# Patient Record
Sex: FEMALE | Race: BLACK OR AFRICAN AMERICAN | NOT HISPANIC OR LATINO
[De-identification: names, ages, dates, MRNs, and addresses within clinical notes are randomized per-mention and may not be internally consistent; named-entity substitution may affect disease eponyms.]

---

## 2018-01-16 ENCOUNTER — TRANSCRIPTION ENCOUNTER (OUTPATIENT)
Age: 28
End: 2018-01-16

## 2018-04-13 ENCOUNTER — TRANSCRIPTION ENCOUNTER (OUTPATIENT)
Age: 28
End: 2018-04-13

## 2019-05-13 ENCOUNTER — TRANSCRIPTION ENCOUNTER (OUTPATIENT)
Age: 29
End: 2019-05-13

## 2019-11-21 ENCOUNTER — APPOINTMENT (OUTPATIENT)
Dept: SURGERY | Facility: CLINIC | Age: 29
End: 2019-11-21
Payer: COMMERCIAL

## 2019-11-21 VITALS
DIASTOLIC BLOOD PRESSURE: 65 MMHG | WEIGHT: 149 LBS | SYSTOLIC BLOOD PRESSURE: 99 MMHG | HEART RATE: 72 BPM | BODY MASS INDEX: 28.13 KG/M2 | HEIGHT: 61 IN | OXYGEN SATURATION: 100 %

## 2019-11-21 PROCEDURE — 99202 OFFICE O/P NEW SF 15 MIN: CPT

## 2019-12-05 ENCOUNTER — OUTPATIENT (OUTPATIENT)
Dept: OUTPATIENT SERVICES | Facility: HOSPITAL | Age: 29
LOS: 1 days | End: 2019-12-05
Payer: COMMERCIAL

## 2019-12-05 VITALS
DIASTOLIC BLOOD PRESSURE: 67 MMHG | RESPIRATION RATE: 16 BRPM | WEIGHT: 145.06 LBS | TEMPERATURE: 98 F | OXYGEN SATURATION: 100 % | HEART RATE: 78 BPM | SYSTOLIC BLOOD PRESSURE: 104 MMHG

## 2019-12-05 DIAGNOSIS — Z01.818 ENCOUNTER FOR OTHER PREPROCEDURAL EXAMINATION: ICD-10-CM

## 2019-12-05 DIAGNOSIS — K42.9 UMBILICAL HERNIA WITHOUT OBSTRUCTION OR GANGRENE: ICD-10-CM

## 2019-12-05 DIAGNOSIS — Z98.890 OTHER SPECIFIED POSTPROCEDURAL STATES: Chronic | ICD-10-CM

## 2019-12-05 LAB
HCG SERPL-ACNC: <1 MIU/ML — SIGNIFICANT CHANGE UP
HCT VFR BLD CALC: 40.2 % — SIGNIFICANT CHANGE UP (ref 34.5–45)
HGB BLD-MCNC: 13.2 G/DL — SIGNIFICANT CHANGE UP (ref 11.5–15.5)
MCHC RBC-ENTMCNC: 29.1 PG — SIGNIFICANT CHANGE UP (ref 27–34)
MCHC RBC-ENTMCNC: 32.8 GM/DL — SIGNIFICANT CHANGE UP (ref 32–36)
MCV RBC AUTO: 88.5 FL — SIGNIFICANT CHANGE UP (ref 80–100)
NRBC # BLD: 0 /100 WBCS — SIGNIFICANT CHANGE UP (ref 0–0)
PLATELET # BLD AUTO: 311 K/UL — SIGNIFICANT CHANGE UP (ref 150–400)
RBC # BLD: 4.54 M/UL — SIGNIFICANT CHANGE UP (ref 3.8–5.2)
RBC # FLD: 13.7 % — SIGNIFICANT CHANGE UP (ref 10.3–14.5)
WBC # BLD: 6.12 K/UL — SIGNIFICANT CHANGE UP (ref 3.8–10.5)
WBC # FLD AUTO: 6.12 K/UL — SIGNIFICANT CHANGE UP (ref 3.8–10.5)

## 2019-12-05 PROCEDURE — 85027 COMPLETE CBC AUTOMATED: CPT

## 2019-12-05 PROCEDURE — G0463: CPT

## 2019-12-05 PROCEDURE — 36415 COLL VENOUS BLD VENIPUNCTURE: CPT

## 2019-12-05 PROCEDURE — 84702 CHORIONIC GONADOTROPIN TEST: CPT

## 2019-12-05 NOTE — H&P PST ADULT - NSICDXPROBLEM_GEN_ALL_CORE_FT
PROBLEM DIAGNOSES  Problem: Preoperative testing  Assessment and Plan: No medical clearance needed as per surgeon. CBC and HCG ordered. Pre-op instructions and surgical scrubs given and pt verbalized understanding.      Problem: Umbilical hernia without obstruction or gangrene  Assessment and Plan: Open repair umbilical hernia with mesh on 12/9/19.

## 2019-12-05 NOTE — H&P PST ADULT - HISTORY OF PRESENT ILLNESS
30 yo female with no PMH here for PST. Pt first noticed bulge in umbilicus in 7/2019. Pt diagnosed with umbilical hernia by GI doctor and referred to surgeon. Pt reports to pain especially with physical exertion. Pt reports to occasional nausea but denies vomiting. Pt s/p endoscopy and diagnosed with hiatal hernia. Pt denies umbilical hernia from increasing in size. Pt electing for open repair umbilical hernia with mesh on 12/9/19.

## 2019-12-05 NOTE — H&P PST ADULT - NSICDXFAMILYHX_GEN_ALL_CORE_FT
FAMILY HISTORY:  Family history of colon cancer in mother  Family history of pacemaker, (Pt with LVAD, Father alive)

## 2019-12-05 NOTE — H&P PST ADULT - GASTROINTESTINAL DETAILS
normal/bowel sounds normal/no bruit/nontender/no distention/soft/no rebound tenderness/no guarding/no masses palpable/no rigidity/no organomegaly

## 2019-12-08 ENCOUNTER — TRANSCRIPTION ENCOUNTER (OUTPATIENT)
Age: 29
End: 2019-12-08

## 2019-12-09 ENCOUNTER — OUTPATIENT (OUTPATIENT)
Dept: OUTPATIENT SERVICES | Facility: HOSPITAL | Age: 29
LOS: 1 days | End: 2019-12-09
Payer: COMMERCIAL

## 2019-12-09 ENCOUNTER — APPOINTMENT (OUTPATIENT)
Dept: SURGERY | Facility: HOSPITAL | Age: 29
End: 2019-12-09

## 2019-12-09 ENCOUNTER — RESULT REVIEW (OUTPATIENT)
Age: 29
End: 2019-12-09

## 2019-12-09 VITALS
DIASTOLIC BLOOD PRESSURE: 62 MMHG | RESPIRATION RATE: 18 BRPM | OXYGEN SATURATION: 99 % | SYSTOLIC BLOOD PRESSURE: 106 MMHG

## 2019-12-09 VITALS
HEART RATE: 76 BPM | WEIGHT: 139.99 LBS | SYSTOLIC BLOOD PRESSURE: 97 MMHG | DIASTOLIC BLOOD PRESSURE: 58 MMHG | TEMPERATURE: 98 F | RESPIRATION RATE: 12 BRPM | HEIGHT: 65 IN | OXYGEN SATURATION: 98 %

## 2019-12-09 DIAGNOSIS — K42.9 UMBILICAL HERNIA WITHOUT OBSTRUCTION OR GANGRENE: ICD-10-CM

## 2019-12-09 DIAGNOSIS — Z98.890 OTHER SPECIFIED POSTPROCEDURAL STATES: Chronic | ICD-10-CM

## 2019-12-09 PROCEDURE — 49653: CPT

## 2019-12-09 PROCEDURE — 86900 BLOOD TYPING SEROLOGIC ABO: CPT

## 2019-12-09 PROCEDURE — 36415 COLL VENOUS BLD VENIPUNCTURE: CPT

## 2019-12-09 PROCEDURE — 49653: CPT | Mod: AS

## 2019-12-09 PROCEDURE — 88302 TISSUE EXAM BY PATHOLOGIST: CPT | Mod: 26

## 2019-12-09 PROCEDURE — 49652: CPT

## 2019-12-09 PROCEDURE — 88302 TISSUE EXAM BY PATHOLOGIST: CPT

## 2019-12-09 PROCEDURE — 86901 BLOOD TYPING SEROLOGIC RH(D): CPT

## 2019-12-09 PROCEDURE — 86850 RBC ANTIBODY SCREEN: CPT

## 2019-12-09 RX ORDER — ACETAMINOPHEN 500 MG
2 TABLET ORAL
Qty: 0 | Refills: 0 | DISCHARGE

## 2019-12-09 RX ORDER — HYDROMORPHONE HYDROCHLORIDE 2 MG/ML
0.5 INJECTION INTRAMUSCULAR; INTRAVENOUS; SUBCUTANEOUS
Refills: 0 | Status: DISCONTINUED | OUTPATIENT
Start: 2019-12-09 | End: 2019-12-09

## 2019-12-09 RX ORDER — OXYCODONE HYDROCHLORIDE 5 MG/1
1 TABLET ORAL
Qty: 20 | Refills: 0
Start: 2019-12-09 | End: 2019-12-13

## 2019-12-09 RX ORDER — OXYCODONE HYDROCHLORIDE 5 MG/1
5 TABLET ORAL ONCE
Refills: 0 | Status: DISCONTINUED | OUTPATIENT
Start: 2019-12-09 | End: 2019-12-09

## 2019-12-09 RX ORDER — SODIUM CHLORIDE 9 MG/ML
1000 INJECTION, SOLUTION INTRAVENOUS
Refills: 0 | Status: DISCONTINUED | OUTPATIENT
Start: 2019-12-09 | End: 2019-12-09

## 2019-12-09 RX ORDER — METOCLOPRAMIDE HCL 10 MG
5 TABLET ORAL ONCE
Refills: 0 | Status: DISCONTINUED | OUTPATIENT
Start: 2019-12-09 | End: 2019-12-09

## 2019-12-09 RX ORDER — OXYCODONE HYDROCHLORIDE 5 MG/1
10 TABLET ORAL ONCE
Refills: 0 | Status: DISCONTINUED | OUTPATIENT
Start: 2019-12-09 | End: 2019-12-09

## 2019-12-09 RX ORDER — CEFAZOLIN SODIUM 1 G
2000 VIAL (EA) INJECTION ONCE
Refills: 0 | Status: COMPLETED | OUTPATIENT
Start: 2019-12-09 | End: 2019-12-09

## 2019-12-09 RX ADMIN — SODIUM CHLORIDE 100 MILLILITER(S): 9 INJECTION, SOLUTION INTRAVENOUS at 15:12

## 2019-12-09 RX ADMIN — SODIUM CHLORIDE 60 MILLILITER(S): 9 INJECTION, SOLUTION INTRAVENOUS at 12:04

## 2019-12-09 NOTE — ASU DISCHARGE PLAN (ADULT/PEDIATRIC) - CARE PROVIDER_API CALL
Dada Price)  Surgery  [address] [place] [address]  Phone: [phone]  Fax: [phone]  Follow Up Time:

## 2019-12-16 PROBLEM — K42.9 UMBILICAL HERNIA WITHOUT OBSTRUCTION OR GANGRENE: Chronic | Status: ACTIVE | Noted: 2019-12-05

## 2019-12-19 ENCOUNTER — APPOINTMENT (OUTPATIENT)
Dept: SURGERY | Facility: CLINIC | Age: 29
End: 2019-12-19
Payer: COMMERCIAL

## 2019-12-19 VITALS
SYSTOLIC BLOOD PRESSURE: 107 MMHG | OXYGEN SATURATION: 97 % | DIASTOLIC BLOOD PRESSURE: 76 MMHG | WEIGHT: 149 LBS | HEIGHT: 61 IN | HEART RATE: 80 BPM | RESPIRATION RATE: 14 BRPM | BODY MASS INDEX: 28.13 KG/M2

## 2019-12-19 PROCEDURE — 99024 POSTOP FOLLOW-UP VISIT: CPT

## 2019-12-23 ENCOUNTER — OTHER (OUTPATIENT)
Age: 29
End: 2019-12-23

## 2019-12-26 ENCOUNTER — OTHER (OUTPATIENT)
Age: 29
End: 2019-12-26

## 2022-04-18 ENCOUNTER — RESULT REVIEW (OUTPATIENT)
Age: 32
End: 2022-04-18

## 2022-05-17 PROBLEM — Z00.00 ENCOUNTER FOR PREVENTIVE HEALTH EXAMINATION: Status: ACTIVE | Noted: 2022-05-17

## 2022-05-18 ENCOUNTER — NON-APPOINTMENT (OUTPATIENT)
Age: 32
End: 2022-05-18

## 2022-05-18 ENCOUNTER — APPOINTMENT (OUTPATIENT)
Dept: ANTEPARTUM | Facility: CLINIC | Age: 32
End: 2022-05-18
Payer: COMMERCIAL

## 2022-05-18 ENCOUNTER — ASOB RESULT (OUTPATIENT)
Age: 32
End: 2022-05-18

## 2022-05-18 DIAGNOSIS — O35.1XX0 MATERNAL CARE FOR (SUSPECTED) CHROMOSOMAL ABNORMALITY IN FETUS, NOT APPLICABLE OR UNSPECIFIED: ICD-10-CM

## 2022-05-18 PROCEDURE — 76813 OB US NUCHAL MEAS 1 GEST: CPT

## 2022-05-18 PROCEDURE — ZZZZZ: CPT

## 2022-05-18 PROCEDURE — 36416 COLLJ CAPILLARY BLOOD SPEC: CPT

## 2022-05-23 LAB
1ST TRIMESTER DATA: NORMAL
ADDENDUM DOC: NORMAL
AFP PNL SERPL: NORMAL
AFP SERPL-ACNC: NORMAL
CLINICAL BIOCHEMIST REVIEW: NORMAL
FREE BETA HCG 1ST TRIMESTER: NORMAL
Lab: NORMAL
NOTES NTD: NORMAL
NT: NORMAL
PAPP-A SERPL-ACNC: NORMAL
TRISOMY 18/3: NORMAL

## 2022-06-10 ENCOUNTER — ASOB RESULT (OUTPATIENT)
Age: 32
End: 2022-06-10

## 2022-06-10 ENCOUNTER — APPOINTMENT (OUTPATIENT)
Dept: MATERNAL FETAL MEDICINE | Facility: CLINIC | Age: 32
End: 2022-06-10
Payer: COMMERCIAL

## 2022-06-10 PROCEDURE — 99202 OFFICE O/P NEW SF 15 MIN: CPT | Mod: 95

## 2022-06-16 ENCOUNTER — APPOINTMENT (OUTPATIENT)
Dept: ANTEPARTUM | Facility: CLINIC | Age: 32
End: 2022-06-16
Payer: COMMERCIAL

## 2022-06-16 PROCEDURE — 36415 COLL VENOUS BLD VENIPUNCTURE: CPT

## 2022-06-20 LAB
1ST TRIMESTER DATA: NORMAL
2ND TRIMESTER DATA: NORMAL
AFP PNL SERPL: NORMAL
AFP SERPL-ACNC: NORMAL
AFP SERPL-ACNC: NORMAL
B-HCG FREE SERPL-MCNC: NORMAL
CLINICAL BIOCHEMIST REVIEW: NORMAL
FREE BETA HCG 1ST TRIMESTER: NORMAL
INHIBIN A SERPL-MCNC: NORMAL
NOTES NTD: NORMAL
NT: NORMAL
PAPP-A SERPL-ACNC: NORMAL
U ESTRIOL SERPL-SCNC: NORMAL

## 2022-06-24 ENCOUNTER — NON-APPOINTMENT (OUTPATIENT)
Age: 32
End: 2022-06-24

## 2022-07-18 ENCOUNTER — ASOB RESULT (OUTPATIENT)
Age: 32
End: 2022-07-18

## 2022-07-18 ENCOUNTER — APPOINTMENT (OUTPATIENT)
Dept: ANTEPARTUM | Facility: CLINIC | Age: 32
End: 2022-07-18

## 2022-07-18 PROCEDURE — 76805 OB US >/= 14 WKS SNGL FETUS: CPT

## 2022-07-18 PROCEDURE — 76817 TRANSVAGINAL US OBSTETRIC: CPT

## 2022-08-01 ENCOUNTER — ASOB RESULT (OUTPATIENT)
Age: 32
End: 2022-08-01

## 2022-08-01 ENCOUNTER — APPOINTMENT (OUTPATIENT)
Dept: ANTEPARTUM | Facility: CLINIC | Age: 32
End: 2022-08-01

## 2022-08-01 PROCEDURE — 76816 OB US FOLLOW-UP PER FETUS: CPT

## 2022-09-28 ENCOUNTER — APPOINTMENT (OUTPATIENT)
Dept: ANTEPARTUM | Facility: CLINIC | Age: 32
End: 2022-09-28

## 2022-09-28 ENCOUNTER — ASOB RESULT (OUTPATIENT)
Age: 32
End: 2022-09-28

## 2022-09-28 PROCEDURE — 76816 OB US FOLLOW-UP PER FETUS: CPT

## 2022-10-31 ENCOUNTER — ASOB RESULT (OUTPATIENT)
Age: 32
End: 2022-10-31

## 2022-10-31 ENCOUNTER — APPOINTMENT (OUTPATIENT)
Dept: ANTEPARTUM | Facility: CLINIC | Age: 32
End: 2022-10-31

## 2022-10-31 PROCEDURE — 76816 OB US FOLLOW-UP PER FETUS: CPT

## 2022-11-24 ENCOUNTER — INPATIENT (INPATIENT)
Facility: HOSPITAL | Age: 32
LOS: 2 days | Discharge: HOME | End: 2022-11-27
Attending: OBSTETRICS & GYNECOLOGY | Admitting: OBSTETRICS & GYNECOLOGY

## 2022-11-24 VITALS — SYSTOLIC BLOOD PRESSURE: 105 MMHG | HEART RATE: 92 BPM | DIASTOLIC BLOOD PRESSURE: 62 MMHG

## 2022-11-24 DIAGNOSIS — Z98.890 OTHER SPECIFIED POSTPROCEDURAL STATES: Chronic | ICD-10-CM

## 2022-11-24 LAB
BASOPHILS # BLD AUTO: 0.05 K/UL — SIGNIFICANT CHANGE UP (ref 0–0.2)
BASOPHILS NFR BLD AUTO: 0.5 % — SIGNIFICANT CHANGE UP (ref 0–2)
EOSINOPHIL # BLD AUTO: 0.03 K/UL — SIGNIFICANT CHANGE UP (ref 0–0.5)
EOSINOPHIL NFR BLD AUTO: 0.3 % — SIGNIFICANT CHANGE UP (ref 0–6)
HCT VFR BLD CALC: 39.4 % — SIGNIFICANT CHANGE UP (ref 34.5–45)
HGB BLD-MCNC: 12.9 G/DL — SIGNIFICANT CHANGE UP (ref 11.5–15.5)
IANC: 7.4 K/UL — SIGNIFICANT CHANGE UP (ref 1.8–7.4)
IMM GRANULOCYTES NFR BLD AUTO: 1.8 % — HIGH (ref 0–0.9)
LYMPHOCYTES # BLD AUTO: 1.69 K/UL — SIGNIFICANT CHANGE UP (ref 1–3.3)
LYMPHOCYTES # BLD AUTO: 17 % — SIGNIFICANT CHANGE UP (ref 13–44)
MCHC RBC-ENTMCNC: 31 PG — SIGNIFICANT CHANGE UP (ref 27–34)
MCHC RBC-ENTMCNC: 32.7 GM/DL — SIGNIFICANT CHANGE UP (ref 32–36)
MCV RBC AUTO: 94.7 FL — SIGNIFICANT CHANGE UP (ref 80–100)
MONOCYTES # BLD AUTO: 0.62 K/UL — SIGNIFICANT CHANGE UP (ref 0–0.9)
MONOCYTES NFR BLD AUTO: 6.2 % — SIGNIFICANT CHANGE UP (ref 2–14)
NEUTROPHILS # BLD AUTO: 7.4 K/UL — SIGNIFICANT CHANGE UP (ref 1.8–7.4)
NEUTROPHILS NFR BLD AUTO: 74.2 % — SIGNIFICANT CHANGE UP (ref 43–77)
NRBC # BLD: 0 /100 WBCS — SIGNIFICANT CHANGE UP (ref 0–0)
NRBC # FLD: 0 K/UL — SIGNIFICANT CHANGE UP (ref 0–0)
PLATELET # BLD AUTO: 236 K/UL — SIGNIFICANT CHANGE UP (ref 150–400)
RBC # BLD: 4.16 M/UL — SIGNIFICANT CHANGE UP (ref 3.8–5.2)
RBC # FLD: 14.5 % — SIGNIFICANT CHANGE UP (ref 10.3–14.5)
WBC # BLD: 9.97 K/UL — SIGNIFICANT CHANGE UP (ref 3.8–10.5)
WBC # FLD AUTO: 9.97 K/UL — SIGNIFICANT CHANGE UP (ref 3.8–10.5)

## 2022-11-24 RX ORDER — OXYTOCIN 10 UNIT/ML
333.33 VIAL (ML) INJECTION
Qty: 20 | Refills: 0 | Status: DISCONTINUED | OUTPATIENT
Start: 2022-11-24 | End: 2022-11-27

## 2022-11-24 RX ORDER — CITRIC ACID/SODIUM CITRATE 300-500 MG
15 SOLUTION, ORAL ORAL EVERY 6 HOURS
Refills: 0 | Status: DISCONTINUED | OUTPATIENT
Start: 2022-11-24 | End: 2022-11-26

## 2022-11-24 RX ORDER — SODIUM CHLORIDE 9 MG/ML
1000 INJECTION, SOLUTION INTRAVENOUS
Refills: 0 | Status: DISCONTINUED | OUTPATIENT
Start: 2022-11-24 | End: 2022-11-26

## 2022-11-24 RX ORDER — CHLORHEXIDINE GLUCONATE 213 G/1000ML
1 SOLUTION TOPICAL ONCE
Refills: 0 | Status: DISCONTINUED | OUTPATIENT
Start: 2022-11-24 | End: 2022-11-26

## 2022-11-24 RX ADMIN — SODIUM CHLORIDE 125 MILLILITER(S): 9 INJECTION, SOLUTION INTRAVENOUS at 22:12

## 2022-11-24 NOTE — OB PROVIDER H&P - NSHPPHYSICALEXAM_GEN_ALL_CORE
T(C): 36.7 (11-24-22 @ 21:00), Max: 36.7 (11-24-22 @ 21:00)  HR: 92 (11-24-22 @ 21:00) (92 - 92)  BP: 105/62 (11-24-22 @ 21:00) (105/62 - 105/62)  RR: 15 (11-24-22 @ 21:00) (15 - 15)  SpO2: --    Gen: NAD, well-appearing, AAOx3   Abd: Soft, gravid  Ext: non-tender, non-edematous  SSE: negative this week as per Dr. Gregory  SVE:  0.5/0/-3  Bedside sono: vertex  FHT: 150bpm, moderate variability, +accels, no decels  [place]: q3-4

## 2022-11-24 NOTE — OB PROVIDER H&P - NSLOWPPHRISK_OBGYN_A_OB
No previous uterine incision/[place] Pregnancy/Less than or equal to 4 previous vaginal births/No known bleeding disorder/No history of postpartum hemorrhage/No other PPH risks indicated

## 2022-11-24 NOTE — OB PROVIDER H&P - ASSESSMENT
A/P: 32y  at 40w GA who presents to L&D for elective induction of labor.   -Admit to L&D  -Consents signed  -Admission labs  -CLD  -IV fluids  -Labor: IOL with buccal cytotec  -Fetus: Cat I tracing. Continuous toco and fetal monitoring.   -GBS: Negative, no GBS ppx required     Discussed with Dr. Colt Ramirez PGY1 A/P: 32y  at 40w GA who presents to L&D for elective induction of labor.   -Admit to L&D  -Consents signed  -Admission labs  -CLD  -IV fluids  -Labor: IOL with buccal cytotec  -Fetus: Cat I tracing. Continuous toco and fetal monitoring.   -GBS: Negative, no GBS ppx required   -Analgesia: Approved for epidural PRN by Dr. Gregory    Discussed with Dr. Colt Ramirez PGY1

## 2022-11-24 NOTE — OB RN PATIENT PROFILE - HEIGHT IN CM
165.1 Benzoyl Peroxide Pregnancy And Lactation Text: This medication is Pregnancy Category C. It is unknown if benzoyl peroxide is excreted in breast milk.

## 2022-11-24 NOTE — OB PROVIDER H&P - HISTORY OF PRESENT ILLNESS
None
32y  at 40w GA who presents to L&D for elective induction of labor. Patient denies vaginal bleeding, contractions and leakage of fluid. She endorses good fetal movement. Denies fevers, chills, nausea, vomiting, chest pain, SOB, dizziness and headache. No other complaints at this time.     JOAN: 22    Prenatal course uncomplicated.  Patient reports she failed her one hour GCT, but passed 3 hour GTT    Obhx:  - 2011: FT  7#6, uncomplicated  - 2013: SAB with d&c - states she was bleeding heavily with miscarriage but did not require a transfusion  - TOP with d&c x3 - , ,   Gynhx: -fibroids, -ovarian cysts, denies STD hx, denies abnormal PAPs, endorses HSV2 seropositive - denies any history of outbreaks or vaginal lesions, per Dr. Gregory, SSE exam negative for lesions in office this week  PMH: Denies including HTN, DM, asthma  PSH:   - D&C x4  - 2018: lsc umbilical hernia repair  Soc hx: Denies EtOH, tobacco and illicit drug use during this pregnancy  Anx/dep:  Endorses anxiety, not on medication and does not see a specialist; denies history of depression  Meds: PNV, Prophylactic Valtrex starting at 36w  Allergies: NKDA  GBS: Negative  EFW: 3400    Will accept blood transfusion

## 2022-11-25 LAB
COVID-19 SPIKE DOMAIN AB INTERP: POSITIVE
COVID-19 SPIKE DOMAIN ANTIBODY RESULT: >250 U/ML — HIGH
SARS-COV-2 IGG+IGM SERPL QL IA: >250 U/ML — HIGH
SARS-COV-2 IGG+IGM SERPL QL IA: POSITIVE
SARS-COV-2 RNA SPEC QL NAA+PROBE: SIGNIFICANT CHANGE UP
T PALLIDUM AB TITR SER: NEGATIVE — SIGNIFICANT CHANGE UP

## 2022-11-25 RX ORDER — SODIUM CHLORIDE 9 MG/ML
3 INJECTION INTRAMUSCULAR; INTRAVENOUS; SUBCUTANEOUS EVERY 8 HOURS
Refills: 0 | Status: DISCONTINUED | OUTPATIENT
Start: 2022-11-25 | End: 2022-11-26

## 2022-11-25 RX ORDER — OXYCODONE HYDROCHLORIDE 5 MG/1
5 TABLET ORAL
Refills: 0 | Status: DISCONTINUED | OUTPATIENT
Start: 2022-11-25 | End: 2022-11-27

## 2022-11-25 RX ORDER — TETANUS TOXOID, REDUCED DIPHTHERIA TOXOID AND ACELLULAR PERTUSSIS VACCINE, ADSORBED 5; 2.5; 8; 8; 2.5 [IU]/.5ML; [IU]/.5ML; UG/.5ML; UG/.5ML; UG/.5ML
0.5 SUSPENSION INTRAMUSCULAR ONCE
Refills: 0 | Status: DISCONTINUED | OUTPATIENT
Start: 2022-11-25 | End: 2022-11-27

## 2022-11-25 RX ORDER — OXYTOCIN 10 UNIT/ML
41.67 VIAL (ML) INJECTION
Qty: 20 | Refills: 0 | Status: DISCONTINUED | OUTPATIENT
Start: 2022-11-25 | End: 2022-11-27

## 2022-11-25 RX ORDER — DIPHENHYDRAMINE HCL 50 MG
25 CAPSULE ORAL EVERY 6 HOURS
Refills: 0 | Status: DISCONTINUED | OUTPATIENT
Start: 2022-11-25 | End: 2022-11-27

## 2022-11-25 RX ORDER — OXYCODONE HYDROCHLORIDE 5 MG/1
5 TABLET ORAL ONCE
Refills: 0 | Status: DISCONTINUED | OUTPATIENT
Start: 2022-11-25 | End: 2022-11-27

## 2022-11-25 RX ORDER — SIMETHICONE 80 MG/1
80 TABLET, CHEWABLE ORAL EVERY 4 HOURS
Refills: 0 | Status: DISCONTINUED | OUTPATIENT
Start: 2022-11-25 | End: 2022-11-27

## 2022-11-25 RX ORDER — DIBUCAINE 1 %
1 OINTMENT (GRAM) RECTAL EVERY 6 HOURS
Refills: 0 | Status: DISCONTINUED | OUTPATIENT
Start: 2022-11-25 | End: 2022-11-27

## 2022-11-25 RX ORDER — KETOROLAC TROMETHAMINE 30 MG/ML
30 SYRINGE (ML) INJECTION ONCE
Refills: 0 | Status: DISCONTINUED | OUTPATIENT
Start: 2022-11-25 | End: 2022-11-25

## 2022-11-25 RX ORDER — IBUPROFEN 200 MG
600 TABLET ORAL EVERY 6 HOURS
Refills: 0 | Status: COMPLETED | OUTPATIENT
Start: 2022-11-25 | End: 2023-10-24

## 2022-11-25 RX ORDER — ACETAMINOPHEN 500 MG
975 TABLET ORAL
Refills: 0 | Status: DISCONTINUED | OUTPATIENT
Start: 2022-11-25 | End: 2022-11-27

## 2022-11-25 RX ORDER — HYDROCORTISONE 1 %
1 OINTMENT (GRAM) TOPICAL EVERY 6 HOURS
Refills: 0 | Status: DISCONTINUED | OUTPATIENT
Start: 2022-11-25 | End: 2022-11-27

## 2022-11-25 RX ORDER — BENZOCAINE 10 %
1 GEL (GRAM) MUCOUS MEMBRANE EVERY 6 HOURS
Refills: 0 | Status: DISCONTINUED | OUTPATIENT
Start: 2022-11-25 | End: 2022-11-27

## 2022-11-25 RX ORDER — MAGNESIUM HYDROXIDE 400 MG/1
30 TABLET, CHEWABLE ORAL
Refills: 0 | Status: DISCONTINUED | OUTPATIENT
Start: 2022-11-25 | End: 2022-11-27

## 2022-11-25 RX ORDER — PRAMOXINE HYDROCHLORIDE 150 MG/15G
1 AEROSOL, FOAM RECTAL EVERY 4 HOURS
Refills: 0 | Status: DISCONTINUED | OUTPATIENT
Start: 2022-11-25 | End: 2022-11-27

## 2022-11-25 RX ORDER — LANOLIN
1 OINTMENT (GRAM) TOPICAL EVERY 6 HOURS
Refills: 0 | Status: DISCONTINUED | OUTPATIENT
Start: 2022-11-25 | End: 2022-11-27

## 2022-11-25 RX ORDER — AER TRAVELER 0.5 G/1
1 SOLUTION RECTAL; TOPICAL EVERY 4 HOURS
Refills: 0 | Status: DISCONTINUED | OUTPATIENT
Start: 2022-11-25 | End: 2022-11-27

## 2022-11-25 RX ADMIN — Medication 0.2 MILLIGRAM(S): at 20:07

## 2022-11-25 RX ADMIN — SODIUM CHLORIDE 125 MILLILITER(S): 9 INJECTION, SOLUTION INTRAVENOUS at 19:30

## 2022-11-25 RX ADMIN — Medication 125 MILLIUNIT(S)/MIN: at 22:00

## 2022-11-25 RX ADMIN — SODIUM CHLORIDE 125 MILLILITER(S): 9 INJECTION, SOLUTION INTRAVENOUS at 07:38

## 2022-11-25 RX ADMIN — Medication 30 MILLIGRAM(S): at 23:44

## 2022-11-25 NOTE — OB PROVIDER LABOR PROGRESS NOTE - NS_OBIHIFHRDETAILS_OBGYN_ALL_OB_FT
150 moderate variability, +15x15 accels, +variable and prolonged decels
155 mod glenn/+accels/-decels

## 2022-11-25 NOTE — OB RN DELIVERY SUMMARY - NS_SEPSISRSKCALC_OBGYN_ALL_OB_FT
EOS calculated successfully. EOS Risk Factor: 0.5/1000 live births ([facility] national incidence); GA=40w1d; Temp=98.42; ROM=0.3; GBS='Negative'; Antibiotics='No antibiotics or any antibiotics < 2 hrs prior to birth'

## 2022-11-25 NOTE — OB PROVIDER LABOR PROGRESS NOTE - ASSESSMENT
@40.1 wks rrIOL  VE unchanged  Cervical balloon placed without complication  60cc's instilled in both uterine and vaginal balloons  Patient tolerated well  Cont with cytotec  Cont EFM/TOCO  Will reassess PRN    miroslava Steen NP
33 yo  @40.1 wga rrIOL.  GBS (-)    1.  rrIOL -  s/p CB now 5 cm ctxing 1-2, continue expectant management at this time  2.  FHT category II will continue to monitor closely  3.  Dr. Gregory updated and en route    Samara Sales MD

## 2022-11-25 NOTE — OB PROVIDER LABOR PROGRESS NOTE - NS_SUBJECTIVE/OBJECTIVE_OBGYN_ALL_OB_FT
Patient seen for placement of cooks cervical balloon. Effective epidural in place. Patient comfortable, no complaints at this time.  VS  T(C): 36.9 (11-25-22 @ 09:36)  HR: 75 (11-25-22 @ 11:20)  BP: 99/64 (11-25-22 @ 11:20)  RR: 15 (11-24-22 @ 21:00)  SpO2: 100% (11-25-22 @ 11:14)
RN informed me pt feeling rectal pressure.  Went to room to examine pt.  VE 5/90/-3, intact.  Requesting epidural top-off.

## 2022-11-25 NOTE — OB PROVIDER DELIVERY SUMMARY - AMNIOTIC FLUID COLOR, LABOR
-- DO NOT REPLY / DO NOT REPLY ALL --  -- Message is from Engagement Center Operations (ECO) --    General Patient Message RETURNING PHONE CALL but no task on file ,      Caller Information       Type Contact Phone/Fax    10/18/2022 02:41 PM CDT Phone (Incoming) Morrison Azam Michell R (Self) [phone] (H)        Alternative phone number: NA    Can a detailed message be left? Yes    Message Turnaround:     Is it Working Hours? Yes - Working Hours     [place]:    Please give this turnaround time to the caller:   \"This message will be sent to [state Provider's name]. The clinical team will fulfill your request as soon as they review your message.\"                 clear

## 2022-11-25 NOTE — OB PROVIDER DELIVERY SUMMARY - NSPROVIDERDELIVERYNOTE_OBGYN_ALL_OB_FT
of Viable female infant from PHANI presentation over a first degree laceration. Cord around the body noted. Rectum and intact. Fundus firm. IM Methergine given fro continued oozing, APGARS 9/9.  of Viable female infant from PHANI presentation over a first degree laceration. Cord around the body noted. Rectum and intact. Fundus firm. IM Methergine given fro continued oozing, APGAR 9/9. Birth Wt. 7 lbs 3 oz.

## 2022-11-25 NOTE — OB RN DELIVERY SUMMARY - NSSELHIDDEN_OBGYN_ALL_OB_FT
[NS_DeliveryAttending1_OBGYN_ALL_OB_FT:[id number]],[NS_DeliveryRN_OBGYN_ALL_OB_FT:[id number]=]

## 2022-11-26 ENCOUNTER — TRANSCRIPTION ENCOUNTER (OUTPATIENT)
Age: 32
End: 2022-11-26

## 2022-11-26 LAB
HCT VFR BLD CALC: 42.9 % — SIGNIFICANT CHANGE UP (ref 34.5–45)
HGB BLD-MCNC: 14.1 G/DL — SIGNIFICANT CHANGE UP (ref 11.5–15.5)
MCHC RBC-ENTMCNC: 30.8 PG — SIGNIFICANT CHANGE UP (ref 27–34)
MCHC RBC-ENTMCNC: 32.9 GM/DL — SIGNIFICANT CHANGE UP (ref 32–36)
MCV RBC AUTO: 93.7 FL — SIGNIFICANT CHANGE UP (ref 80–100)
NRBC # BLD: 0 /100 WBCS — SIGNIFICANT CHANGE UP (ref 0–0)
NRBC # FLD: 0 K/UL — SIGNIFICANT CHANGE UP (ref 0–0)
PLATELET # BLD AUTO: 205 K/UL — SIGNIFICANT CHANGE UP (ref 150–400)
RBC # BLD: 4.58 M/UL — SIGNIFICANT CHANGE UP (ref 3.8–5.2)
RBC # FLD: 14.6 % — HIGH (ref 10.3–14.5)
WBC # BLD: 15.55 K/UL — HIGH (ref 3.8–10.5)
WBC # FLD AUTO: 15.55 K/UL — HIGH (ref 3.8–10.5)

## 2022-11-26 RX ORDER — ACETAMINOPHEN 500 MG
2 TABLET ORAL
Qty: 0 | Refills: 0 | DISCHARGE

## 2022-11-26 RX ORDER — IBUPROFEN 200 MG
600 TABLET ORAL EVERY 6 HOURS
Refills: 0 | Status: DISCONTINUED | OUTPATIENT
Start: 2022-11-26 | End: 2022-11-27

## 2022-11-26 RX ORDER — IBUPROFEN 200 MG
1 TABLET ORAL
Qty: 0 | Refills: 0 | DISCHARGE
Start: 2022-11-26

## 2022-11-26 RX ADMIN — Medication 0.2 MILLIGRAM(S): at 16:30

## 2022-11-26 RX ADMIN — Medication 0.2 MILLIGRAM(S): at 00:00

## 2022-11-26 RX ADMIN — Medication 1 TABLET(S): at 12:03

## 2022-11-26 RX ADMIN — Medication 975 MILLIGRAM(S): at 08:24

## 2022-11-26 RX ADMIN — Medication 975 MILLIGRAM(S): at 09:00

## 2022-11-26 RX ADMIN — Medication 600 MILLIGRAM(S): at 17:53

## 2022-11-26 RX ADMIN — Medication 600 MILLIGRAM(S): at 06:03

## 2022-11-26 RX ADMIN — Medication 0.2 MILLIGRAM(S): at 04:05

## 2022-11-26 RX ADMIN — Medication 0.2 MILLIGRAM(S): at 08:24

## 2022-11-26 RX ADMIN — Medication 600 MILLIGRAM(S): at 12:30

## 2022-11-26 RX ADMIN — Medication 600 MILLIGRAM(S): at 18:22

## 2022-11-26 RX ADMIN — Medication 600 MILLIGRAM(S): at 06:48

## 2022-11-26 RX ADMIN — Medication 0.2 MILLIGRAM(S): at 12:02

## 2022-11-26 RX ADMIN — Medication 600 MILLIGRAM(S): at 12:02

## 2022-11-26 NOTE — DISCHARGE NOTE OB - NS MD DC FALL RISK RISK
For information on Fall & Injury Prevention, visit: [email or web address] OR  [email or web address] OR  https://www.cdc.gov/steadi/patient.html

## 2022-11-26 NOTE — LACTATION INITIAL EVALUATION - LACTATION INTERVENTIONS
initiate/review safe skin-to-skin/initiate/review hand expression/initiate/review techniques for position and latch/initiate/review supplementation plan due to medical indications/initiate/review breast massage/compression/reviewed components of an effective feeding and at least 8 effective feedings per day required/reviewed importance of monitoring infant diapers, the breastfeeding log, and minimum output each day/reviewed risks of unnecessary formula supplementation/reviewed benefits and recommendations for rooming in/reviewed feeding on demand/by cue at least 8 times a day/reviewed indications of inadequate milk transfer that would require supplementation

## 2022-11-26 NOTE — DISCHARGE NOTE OB - MEDICATION SUMMARY - MEDICATIONS TO TAKE
I will START or STAY ON the medications listed below when I get home from the hospital:    ibuprofen 600 mg oral tablet  -- 1 tab(s) by mouth every 6 hours, As Needed  -- Indication: For pain    Tylenol Extra Strength 500 mg oral tablet  -- 2 tab(s) by mouth every 6 hours, As Needed  -- Indication: For pain    Prenatal Multivitamins with Folic Acid 1 mg oral tablet  -- 1 tab(s) by mouth once a day  -- Indication: For postpartum

## 2022-11-26 NOTE — DISCHARGE NOTE OB - CARE PROVIDER_API CALL
Luh Lozano  OBSTETRICS AND GYNECOLOGY  [address] [id number]  Phone: [phone]  Fax: [phone]  Established Patient  Follow Up Time:

## 2022-11-26 NOTE — DISCHARGE NOTE OB - CARE PLAN
Principal Discharge DX:	 (normal spontaneous vaginal delivery)  Assessment and plan of treatment:	Routine postpartum care   1

## 2022-11-26 NOTE — DISCHARGE NOTE OB - MEDICATION SUMMARY - MEDICATIONS TO STOP TAKING
I will STOP taking the medications listed below when I get home from the hospital:    Aleve 220 mg oral capsule  -- 1 cap(s) by mouth every 12 hours, As Needed    oxyCODONE 5 mg oral tablet  -- 1 tab(s) by mouth every 6 hours, As Needed -for severe pain MDD:4  -- Caution federal law prohibits the transfer of this drug to any person other  than the person for whom it was prescribed.  It is very important that you take or use this exactly as directed.  Do not skip doses or discontinue unless directed by your doctor.  May cause drowsiness.  Alcohol may intensify this effect.  Use care when operating dangerous machinery.  This prescription cannot be refilled.  Using more of this medication than prescribed may cause serious breathing problems.

## 2022-11-26 NOTE — DISCHARGE NOTE OB - MATERIALS PROVIDED
Vaccinations/[facility]  Screening Program/  Immunization Record/Breastfeeding Log/Breastfeeding Mother’s Support Group Information/Guide to Postpartum Care/[facility] Hearing Screen Program/Back To Sleep Handout/Birth Certificate Instructions/Discharge Medication Information for Patients and Families Pocket Guide

## 2022-11-26 NOTE — DISCHARGE NOTE OB - PATIENT PORTAL LINK FT
You can access the FollowMyHealth Patient Portal offered by [facility] by registering at the following website: [email or web address]. By joining [email or web address]’s FollowMyHealth portal, you will also be able to view your health information using other applications (apps) compatible with our system.

## 2022-11-26 NOTE — LACTATION INITIAL EVALUATION - INTERVENTION OUTCOME
nbn less than 24  hours  . reviewed with  mother  first  24  hours   safe  skin  to  skin  and  observing   for  feeding  cues/verbalizes understanding/Lactation team to follow up

## 2022-11-27 VITALS
SYSTOLIC BLOOD PRESSURE: 116 MMHG | DIASTOLIC BLOOD PRESSURE: 67 MMHG | TEMPERATURE: 97 F | HEART RATE: 77 BPM | OXYGEN SATURATION: 95 % | RESPIRATION RATE: 18 BRPM

## 2022-11-27 RX ORDER — SENNA PLUS 8.6 MG/1
1 TABLET ORAL
Refills: 0 | Status: DISCONTINUED | OUTPATIENT
Start: 2022-11-27 | End: 2022-11-27

## 2022-11-27 RX ADMIN — Medication 600 MILLIGRAM(S): at 00:33

## 2022-11-27 RX ADMIN — SENNA PLUS 1 TABLET(S): 8.6 TABLET ORAL at 05:46

## 2022-11-27 RX ADMIN — Medication 975 MILLIGRAM(S): at 05:00

## 2022-11-27 RX ADMIN — Medication 1 TABLET(S): at 12:15

## 2022-11-27 RX ADMIN — Medication 600 MILLIGRAM(S): at 01:00

## 2022-11-27 RX ADMIN — Medication 975 MILLIGRAM(S): at 04:23

## 2022-11-27 NOTE — PROGRESS NOTE ADULT - SUBJECTIVE AND OBJECTIVE BOX
NP provider note:    Patient seen at bedside resting comfortably offers no current complaints.  Ambulating and voiding without difficulty.  Passing flatus and tolerating regular diet.  both breast/bottle feeding. Bonding well w  Denies HA, CP, SOB, N/V/D, dizziness, palpitations, worsening abdominal pain, worsening vaginal bleeding, or any other concerns.      Vital Signs Last 24 Hrs  T(C): 37.2 (2022 05:54), Max: 37.2 (2022 05:54)  T(F): 98.9 (2022 05:54), Max: 98.9 (2022 05:54)  HR: 90 (2022 05:54) (65 - 90)  BP: 115/51 (2022 05:54) (98/72 - 115/51)  BP(mean): --  RR: 18 (2022 05:54) (17 - 18)  SpO2: 98% (2022 05:54) (97% - 99%)    Parameters below as of 2022 05:54  Patient On (Oxygen Delivery Method): room air    Physical Exam:     Gen: A&Ox 3, NAD  Chest: CTA B/L  Cardiac: S1,S2; RRR  Breast: Soft, nontender, nonengorged  Abdomen: +BS; Soft, nontender, ND; Fundus firm below umbilicus  Gyn: Min lochia  Ext: Nontender, DTRS 2+, no worsening edema                          14.1   15.55 )-----------( 205      ( 2022 06:20 )             42.9        A/P:  PPD#2 s/p       - Cont postpartum care  - cont PO analgesia  - ambulation encouraged , inc PO hydration  -  Discharge home with instructions  - Follow up in office in 5-6 weeks for postpartum visit  - Breastfeeding encouraged   - Discharge home per DR Gregory (approval for discharge given 22 0858)    Germaine Mcdonough AGNP-C   [id number]
NP provider note:    Patient seen at bedside resting comfortably offers no current complaints.  Ambulating and voiding without difficulty.  Passing flatus and tolerating regular diet.  both breast/bottle feeding. Bonding well w  Denies HA, CP, SOB, N/V/D, dizziness, palpitations, worsening abdominal pain, worsening vaginal bleeding, or any other concerns.      Vital Signs Last 24 Hrs  T(C): 36.7 (2022 09:49), Max: 36.9 (2022 05:06)  T(F): 98.1 (2022 09:49), Max: 98.4 (2022 05:06)  HR: 65 (2022 09:49) (60 - 117)  BP: 98/72 (2022 09:49) (91/61 - 132/74)  BP(mean): --  RR: 18 (2022 09:49) (18 - 18)  SpO2: 97% (2022 09:49) (87% - 100%)    Parameters below as of 2022 05:06  Patient On (Oxygen Delivery Method): room air        Physical Exam:     Gen: A&Ox 3, NAD  Chest: CTA B/L  Cardiac: S1,S2; RRR  Breast: Soft, nontender, nonengorged  Abdomen: +BS; Soft, nontender, ND; Fundus firm below umbilicus  Gyn: Min lochia  Ext: Nontender, DTRS 2+, no worsening edema                          14.1   15.55 )-----------( 205      ( 2022 06:20 )             42.9        A/P:  PPD#1 s/p       - Cont postpartum care  - cont PO analgesia  - ambulation encouraged , inc PO hydration  -  Discharge home with instructions  - Follow up in office in 5-6 weeks for postpartum visit  - Breastfeeding encouraged   - Discharge planning, consider late dc otherwise     Germaine Mcdonough AGNP-C   [id number]

## 2023-02-19 ENCOUNTER — NON-APPOINTMENT (OUTPATIENT)
Age: 33
End: 2023-02-19

## 2023-03-26 NOTE — OB RN DELIVERY SUMMARY - NS_RNDELIVATTEST_OBGYN_ALL_OB
OB Provider reported that the vagina was inspected and no foreign body was found/Laps, needles and instrument count was correct
.

## 2023-09-15 ENCOUNTER — APPOINTMENT (OUTPATIENT)
Dept: ANTEPARTUM | Facility: CLINIC | Age: 33
End: 2023-09-15
Payer: COMMERCIAL

## 2023-09-15 ENCOUNTER — NON-APPOINTMENT (OUTPATIENT)
Age: 33
End: 2023-09-15

## 2023-09-15 ENCOUNTER — ASOB RESULT (OUTPATIENT)
Age: 33
End: 2023-09-15

## 2023-09-15 PROCEDURE — 76801 OB US < 14 WKS SINGLE FETUS: CPT

## 2023-09-18 ENCOUNTER — ASOB RESULT (OUTPATIENT)
Age: 33
End: 2023-09-18

## 2023-09-18 ENCOUNTER — APPOINTMENT (OUTPATIENT)
Dept: MATERNAL FETAL MEDICINE | Facility: CLINIC | Age: 33
End: 2023-09-18
Payer: COMMERCIAL

## 2023-09-18 PROCEDURE — 99202 OFFICE O/P NEW SF 15 MIN: CPT | Mod: 95

## 2023-09-18 PROCEDURE — 99212 OFFICE O/P EST SF 10 MIN: CPT

## 2023-10-08 ENCOUNTER — LABORATORY RESULT (OUTPATIENT)
Age: 33
End: 2023-10-08

## 2023-10-09 ENCOUNTER — ASOB RESULT (OUTPATIENT)
Age: 33
End: 2023-10-09

## 2023-10-09 ENCOUNTER — APPOINTMENT (OUTPATIENT)
Dept: ANTEPARTUM | Facility: CLINIC | Age: 33
End: 2023-10-09
Payer: COMMERCIAL

## 2023-10-09 ENCOUNTER — TRANSCRIPTION ENCOUNTER (OUTPATIENT)
Age: 33
End: 2023-10-09

## 2023-10-09 DIAGNOSIS — O35.9XX0 MATERNAL CARE FOR (SUSPECTED) FETAL ABNORMALITY AND DAMAGE, UNSPECIFIED, NOT APPLICABLE OR UNSPECIFIED: ICD-10-CM

## 2023-10-09 DIAGNOSIS — N91.2 AMENORRHEA, UNSPECIFIED: ICD-10-CM

## 2023-10-09 PROCEDURE — 76813 OB US NUCHAL MEAS 1 GEST: CPT

## 2023-10-09 PROCEDURE — 36415 COLL VENOUS BLD VENIPUNCTURE: CPT

## 2023-10-18 ENCOUNTER — NON-APPOINTMENT (OUTPATIENT)
Age: 33
End: 2023-10-18

## 2023-10-26 LAB
ADDITIONAL US: NORMAL
COMMENTS: AFP: NORMAL
CRL SCAN TWIN B: NORMAL
CRL SCAN: NORMAL
CROWN RUMP LENGTH TWIN B: NORMAL
CROWN RUMP LENGTH: 79.6 MM
DOWN SYNDROME AGE RISK: NORMAL
DOWN SYNDROME INTERPRETATION: NORMAL
DOWN SYNDROME SCREENING RISK: NORMAL
GEST. AGE ON COLLECTION DATE: 13.7 WEEKS
HCG MOM: 1.81
HCG VALUE: 140.6 IU/ML
MATERNAL AGE AT EDD: 34.6 YR
NOTE: AFP: NORMAL
NT MOM TWIN B: NORMAL
NT TWIN B: NORMAL
NUCHAL TRANSLUCENCY (NT): 1.6 MM
NUCHAL TRANSLUCENCY MOM: 0.76
NUMBER OF FETUSES: 1
PAPP-A MOM: 1.29
PAPP-A VALUE: 1915.5 NG/ML
RACE: NORMAL
RESULTS AFP: NORMAL
SONOGRAPHER ID#: NORMAL
SUBMIT PART 2 SAMPLE USING: NORMAL
TEST RESULTS: AFP: NORMAL
TRISOMY 18 AGE RISK: NORMAL
TRISOMY 18 INTERPRETATION: NORMAL
TRISOMY 18 SCREENING RISK: NORMAL
WEIGHT AFP: 155 LBS

## 2023-11-01 ENCOUNTER — APPOINTMENT (OUTPATIENT)
Dept: MATERNAL FETAL MEDICINE | Facility: CLINIC | Age: 33
End: 2023-11-01
Payer: COMMERCIAL

## 2023-11-01 ENCOUNTER — ASOB RESULT (OUTPATIENT)
Age: 33
End: 2023-11-01

## 2023-11-01 PROCEDURE — 99212 OFFICE O/P EST SF 10 MIN: CPT | Mod: 95

## 2023-11-03 ENCOUNTER — LABORATORY RESULT (OUTPATIENT)
Age: 33
End: 2023-11-03

## 2023-11-03 ENCOUNTER — APPOINTMENT (OUTPATIENT)
Dept: ANTEPARTUM | Facility: CLINIC | Age: 33
End: 2023-11-03
Payer: COMMERCIAL

## 2023-11-03 ENCOUNTER — APPOINTMENT (OUTPATIENT)
Dept: MATERNAL FETAL MEDICINE | Facility: CLINIC | Age: 33
End: 2023-11-03
Payer: COMMERCIAL

## 2023-11-03 ENCOUNTER — ASOB RESULT (OUTPATIENT)
Age: 33
End: 2023-11-03

## 2023-11-03 DIAGNOSIS — Z13.71 ENCOUNTER FOR NONPROCREATIVE SCREENING FOR GENETIC DISEASE CARRIER STATUS: ICD-10-CM

## 2023-11-03 PROCEDURE — ZZZZZ: CPT

## 2023-11-03 PROCEDURE — 59000 AMNIOCENTESIS DIAGNOSTIC: CPT

## 2023-12-01 LAB
AFP MOM: 0.74
AFP VALUE: 9.9 UG/ML
ALPHA FETOPROTEIN AMNIOTIC FLUID INTERPRETATION: NORMAL
ALPHA FETOPROTEIN AMNIOTIC FLUID: NORMAL
DIRECTOR: NORMAL
GESTATIONAL AGE(WKS): 16

## 2023-12-04 ENCOUNTER — APPOINTMENT (OUTPATIENT)
Dept: ANTEPARTUM | Facility: CLINIC | Age: 33
End: 2023-12-04
Payer: COMMERCIAL

## 2023-12-04 ENCOUNTER — ASOB RESULT (OUTPATIENT)
Age: 33
End: 2023-12-04

## 2023-12-04 PROCEDURE — 76817 TRANSVAGINAL US OBSTETRIC: CPT

## 2023-12-04 PROCEDURE — 76811 OB US DETAILED SNGL FETUS: CPT

## 2023-12-18 ENCOUNTER — ASOB RESULT (OUTPATIENT)
Age: 33
End: 2023-12-18

## 2023-12-18 ENCOUNTER — APPOINTMENT (OUTPATIENT)
Dept: ANTEPARTUM | Facility: CLINIC | Age: 33
End: 2023-12-18
Payer: COMMERCIAL

## 2023-12-18 PROCEDURE — 76816 OB US FOLLOW-UP PER FETUS: CPT

## 2024-02-01 ENCOUNTER — APPOINTMENT (OUTPATIENT)
Dept: ANTEPARTUM | Facility: CLINIC | Age: 34
End: 2024-02-01

## 2024-02-01 ENCOUNTER — ASOB RESULT (OUTPATIENT)
Age: 34
End: 2024-02-01

## 2024-02-01 ENCOUNTER — OUTPATIENT (OUTPATIENT)
Dept: INPATIENT UNIT | Facility: HOSPITAL | Age: 34
LOS: 1 days | Discharge: HOME | End: 2024-02-01
Payer: COMMERCIAL

## 2024-02-01 ENCOUNTER — APPOINTMENT (OUTPATIENT)
Dept: ANTEPARTUM | Facility: CLINIC | Age: 34
End: 2024-02-01
Payer: COMMERCIAL

## 2024-02-01 VITALS
HEART RATE: 93 BPM | RESPIRATION RATE: 16 BRPM | DIASTOLIC BLOOD PRESSURE: 61 MMHG | TEMPERATURE: 98 F | SYSTOLIC BLOOD PRESSURE: 102 MMHG

## 2024-02-01 VITALS — SYSTOLIC BLOOD PRESSURE: 96 MMHG | DIASTOLIC BLOOD PRESSURE: 55 MMHG | HEART RATE: 86 BPM

## 2024-02-01 DIAGNOSIS — O26.899 OTHER SPECIFIED PREGNANCY RELATED CONDITIONS, UNSPECIFIED TRIMESTER: ICD-10-CM

## 2024-02-01 DIAGNOSIS — Z98.890 OTHER SPECIFIED POSTPROCEDURAL STATES: Chronic | ICD-10-CM

## 2024-02-01 PROCEDURE — 76821 MIDDLE CEREBRAL ARTERY ECHO: CPT | Mod: 59

## 2024-02-01 PROCEDURE — 76816 OB US FOLLOW-UP PER FETUS: CPT

## 2024-02-01 PROCEDURE — 99232 SBSQ HOSP IP/OBS MODERATE 35: CPT

## 2024-02-01 PROCEDURE — 76818 FETAL BIOPHYS PROFILE W/NST: CPT

## 2024-02-01 NOTE — OB PROVIDER TRIAGE NOTE - HISTORY OF PRESENT ILLNESS
PNC: Colt    34y/o  at 29.5weeks sent from [facility] for [facility] consult for polyhydramnios FELIZ 43.89 and rule out ore-term labor. Pt is currently being treated for UTI and started Ceftriaxone yesterday (). Pt denies leaking of fluid, vaginal bleeding, and cramping, but reports occasional tightness and lower abdominal pressure. Reports positive fetal movement.    ATU Sono today : breech, anterior placenta, FELIZ 43.89cm severe polyhydramnios, BPP 8/8. EFW: 1477g, 3#4, 44%-ile.    AP course:  -UTI currently; Tx with Ceftriaxone- started yesterday . negative urine culture as per results .  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 amniocentesis @ 13weeks - [facility]. PNC: Colt    32y/o  at 29.5weeks sent from [facility] for [facility] consult for polyhydramnios FELIZ 43.89 and to rule out ore-term labor. Pt is currently being treated for UTI and started Ceftriaxone yesterday (). Pt denies leaking of fluid, vaginal bleeding, and cramping, but reports occasional tightness and lower abdominal pressure. Reports positive fetal movement.    ATU Sono today : breech, anterior placenta, FELIZ 43.89cm severe polyhydramnios, BPP 8/. EFW: 1477g, 3#4, 44%-ile.    AP course:  -UTI currently; Tx with Ceftriaxone- started yesterday . negative urine culture as per results .  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 amniocentesis @ 13weeks - [facility].

## 2024-02-01 NOTE — OB PROVIDER TRIAGE NOTE - NSOBPROVIDERNOTE_OBGYN_ALL_OB_FT
34y/o  at 29.5weeks with severe polyhydramnios ruling out pre-term labor.    1134:  -MD Wagner at bedside for evaluation and aware of difficulty obtaining NST      -SSE neg  -CL: 4.6-5cm 34y/o  at 29.5weeks with severe polyhydramnios ruling out pre-term labor.    1134:  -MD Bernard MTZM at bedside for evaluation and aware of difficulty obtaining NST    -PO hydration    1255:  -pt continues to deny contractions/cramping, reporting occasional tightness.  -pt cleared to d/c EFM as per MD Wagner.  -as per MD Wagner recommendation, pt to have fetal echo scheduled outpatient, and is cleared for discharge home.      34y/o  at 29.5weeks ruled out for pre-term labor.    - Discussed with Dr. Gregory & Dr. Bernard MTZ  - Patient to be discharged home with follow up and return precautions  - Please follow up with your next scheduled appointment at [facility] on , and with your next OB appointment on , as indicated.  - An email was sent to our [facility] department for a coordinator to reach out to you in regard to scheduling a fetal echo.  - Please return for decreased/no fetal movement, vaginal bleeding similar to that of a period, leaking/gush of fluid, regular contractions.  - Fetal kick counts reviewed.  - Fluid intake encouraged.  - Complete full course of antibiotics you were prescribed.  - Patient and partner and educated of plan and demonstrate understanding. All questions answered. Discharge instructions provided and signed.   - Discharged at 1320. 32y/o  at 29.5weeks with severe polyhydramnios ruling out pre-term labor.    1134:  -MD Wagner M at bedside for evaluation and aware of difficulty obtaining NST    -PO hydration    1255:  -pt continues to deny contractions/cramping, reporting occasional tightness.  -pt cleared to d/c EFM as per MD Wagner.  -as per MD Wagner recommendation, pt to have fetal echo scheduled outpatient and repeat CL at next [facility] appointment,  -Pt is cleared for discharge home, as per MD Wagner.  -pt case d/w MD Gregory.      -[facility] contacted regarding patient, to ensure CL to be performed. spoke with Monika TY.      32y/o  at 29.5weeks ruled out for pre-term labor.    - Discussed with Dr. Gregory & Dr. Wagner [facility]  - Patient to be discharged home with follow up and return precautions  - Please follow up with your next scheduled appointment at [facility] on , and with your next OB appointment on , as indicated.  - An email was sent to our [facility] department for a coordinator to reach out to you in regard to scheduling a fetal echo.  - Please return for decreased/no fetal movement, vaginal bleeding similar to that of a period, leaking/gush of fluid, regular contractions.  - Fetal kick counts reviewed.  - Fluid intake encouraged.  - Complete full course of antibiotics you were prescribed.  - Patient and partner and educated of plan and demonstrate understanding. All questions answered. Discharge instructions provided and signed.   - Discharged at 1320.

## 2024-02-01 NOTE — OB RN TRIAGE NOTE - TEMPERATURE IN CELSIUS (DEGREES C)
Pt is a 74 year old male seen here at [facility] for follow up of ZANE/CKD.    Pt has an extensive past medical history.  He has recently had COVID19 on 9-6-2022  Since then he has been hospitalized three times due to dehydration from high output from his ileostomy.   He has known CKD and history of renal transplant.  Over the past ten days he was initially placed on IV fluids 500 cc per day MWF and now daily  He is now here for rehab.     Medications:  CellCept 500 mg twice daily  Carvedilol 6.25 mg twice daily  Prevacid 30 mg daily  Questran 4 gm daily  Imodium AD 2 mg three times daily  Atorvastatin 20 mg daily  flomax 0.4 mg daily  zofran ODT 4 mg every 6 hours as needed  tacrolimus 1 mg daily  Finasteride 5 mg daily  Prednisone 5 mg daily  Alprazolam 0.25 mg every 8 hours as needed  lantus 25 units sq daily  humalog 6 units sq with meals  Nifedipine 60 mg daily  Doxazosin 2 mg daily  Sodium bicarbonate 1300 mg three times daily    ALLERGIES:  No Known Allergies     PMH:  Hypertension  Hyperlipidemia  Diabetes mellitus   History of lymphoma s/p transplant  History of kidney transplant 2001  Ulcerative colitis s/p ileostomy 40 years ago  TURP 10-  COVID19 9-6-2022    FH:  Noncontributory    SH:  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  Nonsmoker.  Single    ROS:  Just feels fatigued.     Visit Vitals  BP (!) 171/87   Pulse 69   Temp 97.6 °F (36.4 °C)   Resp 18   SpO2 98% Comment: room air      OBJECTIVE:  General:  Alert, talkative, interactive.  Heart:  S1S2 RRR  Lungs:  CTA  Abdomen:  Soft, nontender, ileostomy with liquid stool  Extremities:  Lower legs without edema however right leg appears larger than left leg  Latest labs 11-8-2022:  26  BUN   1.4   Creatinine  111   Chloride  18   Carbon dioxide  142   Sodium   4.0   Potassium   8.7   Hgb      ASSESSMENT/PLAN:  1.  Acute kidney injury/chronic kidney disease  -  Urinalysis 10- showed no protein and no RBCs.   Acute kidney injury is likely due to high output ileostomy.    2.  High output ileostomy - is on scheduled imodium and daily Questran.  Now has PICC line in place and has been receiving 500cc IV fluid daily which will be continued.  Pt reports that he empties his ileostomy ten times per day although it is small amounts and pasty at times.   3.  Anemia secondary to CKD - 10- iron studies show iron 53, TIBC 254, %sat 21.   Now on oral ferrous sulfate three times daily and epogen 10,000 units sq weekly  4.  Acidosis - is on sodium bicarbonate.   5.  HTN - BP elevated.  Now on Nifedipine 60 mg daily and Doxazosin 2 mg daily which will be increased to 8 mg daily  6.  Immunosuppressive status  7.  Right leg swelling -  venous dopplers negative for DVT    Recheck in one week.   CBC/BMP weekly on Tuesdays.      36.6

## 2024-02-01 NOTE — OB RN TRIAGE NOTE - FALL HARM RISK - UNIVERSAL INTERVENTIONS
Bed in lowest position, wheels locked, appropriate side rails in place/Call bell, personal items and telephone in reach/Instruct patient to call for assistance before getting out of bed or chair/Non-slip footwear when patient is out of bed/[place] to call system/Physically safe environment - no spills, clutter or unnecessary equipment/Purposeful Proactive Rounding/Room/bathroom lighting operational, light cord in reach

## 2024-02-01 NOTE — OB PROVIDER TRIAGE NOTE - NSHPPHYSICALEXAM_GEN_ALL_CORE
Vital Signs Last 24 Hrs  T(C): 36.6 (01 Feb 2024 11:09), Max: 36.6 (01 Feb 2024 11:09)  T(F): 97.9 (01 Feb 2024 11:09), Max: 97.9 (01 Feb 2024 11:09)  HR: 97 (01 Feb 2024 12:05) (93 - 106)  BP: 101/57 (01 Feb 2024 12:05) (81/52 - 107/62)  BP(mean): --  RR: 16 (01 Feb 2024 11:09) (16 - 16)  SpO2: --    abdomen soft, non tender  HR reg rate, rhythm  lungs clear, equal b/l  SSE: cervix appears closed, no bleeding, mild leukorrhea  SVE: 0/0/-3  TVS: CL 4.6-5cm, no funneling, no dynamic changes.  nst: discontinuous with periods of moderate variability.  toco: irregular

## 2024-02-01 NOTE — OB PROVIDER TRIAGE NOTE - ADDITIONAL INSTRUCTIONS
34y/o  at 29.5weeks ruled out for pre-term labor.    - Discussed with Dr. Gregory & Dr. Wagner [facility]  - Patient to be discharged home with follow up and return precautions  - Please follow up with your next scheduled appointment at [facility] on , and with your next OB appointment on , as indicated.  - An email was sent to our [facility] department for a coordinator to reach out to you in regard to scheduling a fetal echo.  - Please return for decreased/no fetal movement, vaginal bleeding similar to that of a period, leaking/gush of fluid, regular contractions.  - Fetal kick counts reviewed.  - Fluid intake encouraged.  - Complete full course of antibiotics you were prescribed.  - Patient and partner and educated of plan and demonstrate understanding. All questions answered. Discharge instructions provided and signed.   - Discharged at 1320.

## 2024-02-01 NOTE — OB RN TRIAGE NOTE - CHIEF COMPLAINT QUOTE
seen by ferny issa, sent for [facility] consult for poly 35-40 and ctx on nst

## 2024-02-02 DIAGNOSIS — Z3A.29 29 WEEKS GESTATION OF PREGNANCY: ICD-10-CM

## 2024-02-02 DIAGNOSIS — O23.43 UNSPECIFIED INFECTION OF URINARY TRACT IN PREGNANCY, THIRD TRIMESTER: ICD-10-CM

## 2024-02-02 DIAGNOSIS — O40.3XX0 POLYHYDRAMNIOS, THIRD TRIMESTER, NOT APPLICABLE OR UNSPECIFIED: ICD-10-CM

## 2024-02-02 DIAGNOSIS — O09.293 SUPERVISION OF PREGNANCY WITH OTHER POOR REPRODUCTIVE OR OBSTETRIC HISTORY, THIRD TRIMESTER: ICD-10-CM

## 2024-02-04 DIAGNOSIS — O40.9XX0 POLYHYDRAMNIOS, UNSPECIFIED TRIMESTER, NOT APPLICABLE OR UNSPECIFIED: ICD-10-CM

## 2024-02-05 ENCOUNTER — OUTPATIENT (OUTPATIENT)
Dept: INPATIENT UNIT | Facility: HOSPITAL | Age: 34
LOS: 1 days | Discharge: HOME | End: 2024-02-05
Payer: COMMERCIAL

## 2024-02-05 VITALS
DIASTOLIC BLOOD PRESSURE: 61 MMHG | RESPIRATION RATE: 18 BRPM | SYSTOLIC BLOOD PRESSURE: 113 MMHG | HEART RATE: 99 BPM | TEMPERATURE: 98 F

## 2024-02-05 DIAGNOSIS — Z98.890 OTHER SPECIFIED POSTPROCEDURAL STATES: Chronic | ICD-10-CM

## 2024-02-05 DIAGNOSIS — O26.899 OTHER SPECIFIED PREGNANCY RELATED CONDITIONS, UNSPECIFIED TRIMESTER: ICD-10-CM

## 2024-02-05 LAB
AMNISURE ROM (RUPTURE OF MEMBRANES): NEGATIVE — SIGNIFICANT CHANGE UP
APPEARANCE UR: CLEAR — SIGNIFICANT CHANGE UP
BACTERIA # UR AUTO: ABNORMAL /HPF
BILIRUB UR-MCNC: NEGATIVE — SIGNIFICANT CHANGE UP
CAST: 0 /LPF — SIGNIFICANT CHANGE UP (ref 0–4)
COLOR SPEC: YELLOW — SIGNIFICANT CHANGE UP
DIFF PNL FLD: NEGATIVE — SIGNIFICANT CHANGE UP
GLUCOSE UR QL: NEGATIVE MG/DL — SIGNIFICANT CHANGE UP
KETONES UR-MCNC: NEGATIVE MG/DL — SIGNIFICANT CHANGE UP
LEUKOCYTE ESTERASE UR-ACNC: ABNORMAL
NITRITE UR-MCNC: NEGATIVE — SIGNIFICANT CHANGE UP
PH UR: 6.5 — SIGNIFICANT CHANGE UP (ref 5–8)
PROT UR-MCNC: NEGATIVE MG/DL — SIGNIFICANT CHANGE UP
RBC CASTS # UR COMP ASSIST: 1 /HPF — SIGNIFICANT CHANGE UP (ref 0–4)
SP GR SPEC: 1.01 — SIGNIFICANT CHANGE UP (ref 1–1.03)
SQUAMOUS # UR AUTO: 9 /HPF — HIGH (ref 0–5)
UROBILINOGEN FLD QL: 1 MG/DL — SIGNIFICANT CHANGE UP (ref 0.2–1)
WBC UR QL: 12 /HPF — HIGH (ref 0–5)

## 2024-02-05 PROCEDURE — 99222 1ST HOSP IP/OBS MODERATE 55: CPT

## 2024-02-05 RX ORDER — SODIUM CHLORIDE 9 MG/ML
1000 INJECTION, SOLUTION INTRAVENOUS ONCE
Refills: 0 | Status: COMPLETED | OUTPATIENT
Start: 2024-02-05 | End: 2024-02-06

## 2024-02-05 NOTE — OB RN TRIAGE NOTE - NSMATERNALFETALCONCERNS_OBGYN_ALL_OB_FT
Fetal Alert  2/2/24 - Severe polyhydramnios at 29 weeks. 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 -Nissa Dumont, RNC

## 2024-02-05 NOTE — OB PROVIDER TRIAGE NOTE - NSHPPHYSICALEXAM_GEN_ALL_CORE
Vital Signs Last 24 Hrs  T(C): 36.6 (05 Feb 2024 22:16), Max: 36.6 (05 Feb 2024 21:36)  T(F): 97.88 (05 Feb 2024 22:16), Max: 97.9 (05 Feb 2024 21:36)  HR: 92 (05 Feb 2024 22:17) (92 - 99)  BP: 96/57 (05 Feb 2024 22:17) (96/57 - 113/61)  BP(mean): --  RR: 18 (05 Feb 2024 21:36) (18 - 18)  SpO2: --    abdomen soft, non tender  HR reg rate, rhythm  lungs clear, equal b/l  SSE: cervix appears closed, no bleeding, moderate amount of thick white vaginal discharged possible yeast   SVE: 0/0/-3  TVS: CL 4.5-4.6cm, no funneling, no dynamic changes. saved in ASOB  TAUS: breech presentation, Anterior placenta, FELIZ 26.16cm, BPP 8/8 M-mode 144bpm- saved in ASOB  nst: discontinuous with periods of moderate variability.  toco: irregular ctx

## 2024-02-05 NOTE — OB PROVIDER TRIAGE NOTE - NS_OBACUITY_OBGYN_ALL_OB_DT
05-Feb-2024 21:35 Erythromycin Pregnancy And Lactation Text: This medication is Pregnancy Category B and is considered safe during pregnancy. It is also excreted in breast milk.

## 2024-02-05 NOTE — OB PROVIDER TRIAGE NOTE - HISTORY OF PRESENT ILLNESS
PNC: Colt    34y/o  at 30 2/7 weeks Polyhydramnios presented to D&T for complaints of Shortness of breath at time, abdominal tightness, pain /10 and vaginal leakage of fluid since 1630PM. Pt is currently being treated for UTI with Cephalexin, last dose tomorrow. Pt denies vaginal bleeding, but reports occasional tightness x 2 weeks and lower abdominal pressure. Reports positive fetal movement.    ATU Sono  : breech, anterior placenta, FELIZ 43.89cm severe polyhydramnios, BPP . EFW: 1477g, 3#4, 44%-ile. 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 amniocentesis @ 13weeks - WNL.

## 2024-02-05 NOTE — OB PROVIDER TRIAGE NOTE - NSMATERNALFETALCONCERNS_OBGYN_ALL_OB_FT
Fetal Alert  2/2/24 - Severe polyhydramnios at 29 weeks. 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 -Nissa Dumont, RNC

## 2024-02-05 NOTE — OB PROVIDER TRIAGE NOTE - NSOBPROVIDERNOTE_OBGYN_ALL_OB_FT
32y/o  at 30 2/7 weeks with severe polyhydramnios ruling out pre-term labor and PPROM  no S/S of  labor at this time  no S/S of Rupture of membrane at this time.   0020- Discussed with Dr. Gregory  Pt to be admitted for [facility] consult. 32y/o  at 30 2/7 weeks with severe polyhydramnios ruling out pre-term labor and PPROM  no S/S of  labor at this time  no S/S of Rupture of membrane at this time.   0020- Discussed with Dr. Gregory  [facility] counsult  0030 Discuss with Dr. Palumbo PGY-3/M fellow  0200 discuss with Dr. Palumbo PGY-3 and Dr. Gregory 34y/o  at 30 2/7 weeks with severe polyhydramnios ruling out pre-term labor and PPROM  no S/S of  labor at this time  no S/S of Rupture of membrane at this time.   0020- Discussed with Dr. Gregory  [facility] counsult  0030 Discuss with Dr. Palumbo PGY-3/[facility] fellow  0200 discuss with Dr. Palumbo PGY-3 and Dr. Gregory  - Patient to be discharged home with follow up and return precautions  - Please follow up with your next scheduled appointment at [facility] on , and fetal echo and with your next OB appointment on , as indicated.  - Please return for decreased/no fetal movement, vaginal bleeding similar to that of a period, leaking/gush of fluid, regular contractions.  - Fetal kick counts reviewed.  - Fluid intake encouraged.  - Complete full course of  terconazole as prescribed  - Patient and partner and educated of plan and demonstrate understanding. All questions answered. Discharge instructions provided and signed.   - Discharged at 0217A

## 2024-02-06 VITALS — HEART RATE: 87 BPM | SYSTOLIC BLOOD PRESSURE: 101 MMHG | DIASTOLIC BLOOD PRESSURE: 55 MMHG

## 2024-02-06 RX ADMIN — SODIUM CHLORIDE 2000 MILLILITER(S): 9 INJECTION, SOLUTION INTRAVENOUS at 00:05

## 2024-02-07 DIAGNOSIS — O26.893 OTHER SPECIFIED PREGNANCY RELATED CONDITIONS, THIRD TRIMESTER: ICD-10-CM

## 2024-02-07 DIAGNOSIS — Z3A.30 30 WEEKS GESTATION OF PREGNANCY: ICD-10-CM

## 2024-02-07 DIAGNOSIS — N89.8 OTHER SPECIFIED NONINFLAMMATORY DISORDERS OF VAGINA: ICD-10-CM

## 2024-02-07 DIAGNOSIS — R06.02 SHORTNESS OF BREATH: ICD-10-CM

## 2024-02-07 DIAGNOSIS — O40.3XX0 POLYHYDRAMNIOS, THIRD TRIMESTER, NOT APPLICABLE OR UNSPECIFIED: ICD-10-CM

## 2024-02-07 DIAGNOSIS — O09.293 SUPERVISION OF PREGNANCY WITH OTHER POOR REPRODUCTIVE OR OBSTETRIC HISTORY, THIRD TRIMESTER: ICD-10-CM

## 2024-02-07 DIAGNOSIS — O99.891 OTHER SPECIFIED DISEASES AND CONDITIONS COMPLICATING PREGNANCY: ICD-10-CM

## 2024-02-07 DIAGNOSIS — O23.43 UNSPECIFIED INFECTION OF URINARY TRACT IN PREGNANCY, THIRD TRIMESTER: ICD-10-CM

## 2024-02-07 DIAGNOSIS — O36.8330 MATERNAL CARE FOR ABNORMALITIES OF THE FETAL HEART RATE OR RHYTHM, THIRD TRIMESTER, NOT APPLICABLE OR UNSPECIFIED: ICD-10-CM

## 2024-02-07 LAB
CULTURE RESULTS: SIGNIFICANT CHANGE UP
SPECIMEN SOURCE: SIGNIFICANT CHANGE UP

## 2024-02-08 ENCOUNTER — ASOB RESULT (OUTPATIENT)
Age: 34
End: 2024-02-08

## 2024-02-08 ENCOUNTER — APPOINTMENT (OUTPATIENT)
Dept: PEDIATRIC CARDIOLOGY | Facility: CLINIC | Age: 34
End: 2024-02-08
Payer: COMMERCIAL

## 2024-02-08 ENCOUNTER — APPOINTMENT (OUTPATIENT)
Dept: ANTEPARTUM | Facility: CLINIC | Age: 34
End: 2024-02-08
Payer: COMMERCIAL

## 2024-02-08 PROCEDURE — 76818 FETAL BIOPHYS PROFILE W/NST: CPT

## 2024-02-08 PROCEDURE — 76820 UMBILICAL ARTERY ECHO: CPT | Mod: 26

## 2024-02-08 PROCEDURE — 99204 OFFICE O/P NEW MOD 45 MIN: CPT

## 2024-02-08 PROCEDURE — 76825 ECHO EXAM OF FETAL HEART: CPT

## 2024-02-08 PROCEDURE — 76827 ECHO EXAM OF FETAL HEART: CPT

## 2024-02-08 PROCEDURE — 76821 MIDDLE CEREBRAL ARTERY ECHO: CPT

## 2024-02-08 PROCEDURE — 76821 MIDDLE CEREBRAL ARTERY ECHO: CPT | Mod: 26

## 2024-02-08 PROCEDURE — ZZZZZ: CPT

## 2024-02-15 ENCOUNTER — APPOINTMENT (OUTPATIENT)
Dept: ANTEPARTUM | Facility: CLINIC | Age: 34
End: 2024-02-15
Payer: COMMERCIAL

## 2024-02-15 ENCOUNTER — ASOB RESULT (OUTPATIENT)
Age: 34
End: 2024-02-15

## 2024-02-15 PROCEDURE — 76821 MIDDLE CEREBRAL ARTERY ECHO: CPT

## 2024-02-15 PROCEDURE — 76818 FETAL BIOPHYS PROFILE W/NST: CPT

## 2024-02-19 ENCOUNTER — ASOB RESULT (OUTPATIENT)
Age: 34
End: 2024-02-19

## 2024-02-19 ENCOUNTER — APPOINTMENT (OUTPATIENT)
Dept: ANTEPARTUM | Facility: CLINIC | Age: 34
End: 2024-02-19

## 2024-02-19 ENCOUNTER — APPOINTMENT (OUTPATIENT)
Dept: ANTEPARTUM | Facility: CLINIC | Age: 34
End: 2024-02-19
Payer: COMMERCIAL

## 2024-02-19 ENCOUNTER — INPATIENT (INPATIENT)
Facility: HOSPITAL | Age: 34
LOS: 1 days | Discharge: HOME | End: 2024-02-21
Attending: OBSTETRICS & GYNECOLOGY | Admitting: OBSTETRICS & GYNECOLOGY
Payer: COMMERCIAL

## 2024-02-19 VITALS
TEMPERATURE: 98 F | RESPIRATION RATE: 17 BRPM | HEART RATE: 107 BPM | DIASTOLIC BLOOD PRESSURE: 76 MMHG | SYSTOLIC BLOOD PRESSURE: 107 MMHG

## 2024-02-19 DIAGNOSIS — O40.3XX0 POLYHYDRAMNIOS, THIRD TRIMESTER, NOT APPLICABLE OR UNSPECIFIED: ICD-10-CM

## 2024-02-19 DIAGNOSIS — Z98.890 OTHER SPECIFIED POSTPROCEDURAL STATES: Chronic | ICD-10-CM

## 2024-02-19 DIAGNOSIS — Z36.89 ENCOUNTER FOR OTHER SPECIFIED ANTENATAL SCREENING: ICD-10-CM

## 2024-02-19 DIAGNOSIS — Z36.9 ENCOUNTER FOR ANTENATAL SCREENING, UNSPECIFIED: ICD-10-CM

## 2024-02-19 DIAGNOSIS — O26.899 OTHER SPECIFIED PREGNANCY RELATED CONDITIONS, UNSPECIFIED TRIMESTER: ICD-10-CM

## 2024-02-19 LAB
ALBUMIN SERPL ELPH-MCNC: 3.1 G/DL — LOW (ref 3.3–5)
ALBUMIN SERPL ELPH-MCNC: 3.2 G/DL — LOW (ref 3.3–5)
ALBUMIN SERPL ELPH-MCNC: 3.3 G/DL — SIGNIFICANT CHANGE UP (ref 3.3–5)
ALP SERPL-CCNC: 110 U/L — SIGNIFICANT CHANGE UP (ref 40–120)
ALP SERPL-CCNC: 112 U/L — SIGNIFICANT CHANGE UP (ref 40–120)
ALP SERPL-CCNC: 98 U/L — SIGNIFICANT CHANGE UP (ref 40–120)
ALT FLD-CCNC: 35 U/L — HIGH (ref 4–33)
ALT FLD-CCNC: 41 U/L — HIGH (ref 4–33)
ALT FLD-CCNC: 41 U/L — HIGH (ref 4–33)
AMYLASE P1 CFR SERPL: 48 U/L — SIGNIFICANT CHANGE UP (ref 25–125)
ANION GAP SERPL CALC-SCNC: 13 MMOL/L — SIGNIFICANT CHANGE UP (ref 7–14)
ANION GAP SERPL CALC-SCNC: 14 MMOL/L — SIGNIFICANT CHANGE UP (ref 7–14)
ANION GAP SERPL CALC-SCNC: 18 MMOL/L — HIGH (ref 7–14)
APPEARANCE UR: ABNORMAL
APPEARANCE UR: ABNORMAL
APTT BLD: 26.4 SEC — SIGNIFICANT CHANGE UP (ref 24.5–35.6)
APTT BLD: 27.8 SEC — SIGNIFICANT CHANGE UP (ref 24.5–35.6)
APTT BLD: 39.6 SEC — HIGH (ref 24.5–35.6)
AST SERPL-CCNC: 35 U/L — HIGH (ref 4–32)
AST SERPL-CCNC: 40 U/L — HIGH (ref 4–32)
AST SERPL-CCNC: 41 U/L — HIGH (ref 4–32)
BACTERIA # UR AUTO: ABNORMAL /HPF
BACTERIA # UR AUTO: ABNORMAL /HPF
BASOPHILS # BLD AUTO: 0.02 K/UL — SIGNIFICANT CHANGE UP (ref 0–0.2)
BASOPHILS NFR BLD AUTO: 0.2 % — SIGNIFICANT CHANGE UP (ref 0–2)
BILIRUB SERPL-MCNC: 0.5 MG/DL — SIGNIFICANT CHANGE UP (ref 0.2–1.2)
BILIRUB SERPL-MCNC: 0.6 MG/DL — SIGNIFICANT CHANGE UP (ref 0.2–1.2)
BILIRUB SERPL-MCNC: 0.6 MG/DL — SIGNIFICANT CHANGE UP (ref 0.2–1.2)
BILIRUB UR-MCNC: ABNORMAL
BILIRUB UR-MCNC: ABNORMAL
BLD GP AB SCN SERPL QL: NEGATIVE — SIGNIFICANT CHANGE UP
BUN SERPL-MCNC: 4 MG/DL — LOW (ref 7–23)
BUN SERPL-MCNC: 5 MG/DL — LOW (ref 7–23)
BUN SERPL-MCNC: 5 MG/DL — LOW (ref 7–23)
CALCIUM SERPL-MCNC: 8.7 MG/DL — SIGNIFICANT CHANGE UP (ref 8.4–10.5)
CALCIUM SERPL-MCNC: 8.9 MG/DL — SIGNIFICANT CHANGE UP (ref 8.4–10.5)
CALCIUM SERPL-MCNC: 9.2 MG/DL — SIGNIFICANT CHANGE UP (ref 8.4–10.5)
CAST: 3 /LPF — SIGNIFICANT CHANGE UP (ref 0–4)
CHLORIDE SERPL-SCNC: 103 MMOL/L — SIGNIFICANT CHANGE UP (ref 98–107)
CHLORIDE SERPL-SCNC: 104 MMOL/L — SIGNIFICANT CHANGE UP (ref 98–107)
CHLORIDE SERPL-SCNC: 105 MMOL/L — SIGNIFICANT CHANGE UP (ref 98–107)
CO2 SERPL-SCNC: 17 MMOL/L — LOW (ref 22–31)
CO2 SERPL-SCNC: 18 MMOL/L — LOW (ref 22–31)
CO2 SERPL-SCNC: 21 MMOL/L — LOW (ref 22–31)
COLOR SPEC: ABNORMAL
COLOR SPEC: SIGNIFICANT CHANGE UP
CREAT SERPL-MCNC: 0.65 MG/DL — SIGNIFICANT CHANGE UP (ref 0.5–1.3)
CREAT SERPL-MCNC: 0.66 MG/DL — SIGNIFICANT CHANGE UP (ref 0.5–1.3)
CREAT SERPL-MCNC: 0.69 MG/DL — SIGNIFICANT CHANGE UP (ref 0.5–1.3)
DIFF PNL FLD: ABNORMAL
DIFF PNL FLD: NEGATIVE — SIGNIFICANT CHANGE UP
EGFR: 117 ML/MIN/1.73M2 — SIGNIFICANT CHANGE UP
EGFR: 118 ML/MIN/1.73M2 — SIGNIFICANT CHANGE UP
EGFR: 118 ML/MIN/1.73M2 — SIGNIFICANT CHANGE UP
EOSINOPHIL # BLD AUTO: 0.01 K/UL — SIGNIFICANT CHANGE UP (ref 0–0.5)
EOSINOPHIL NFR BLD AUTO: 0.1 % — SIGNIFICANT CHANGE UP (ref 0–6)
FIBRINOGEN PPP-MCNC: 659 MG/DL — HIGH (ref 200–465)
FIBRINOGEN PPP-MCNC: 679 MG/DL — HIGH (ref 200–465)
GLUCOSE SERPL-MCNC: 69 MG/DL — LOW (ref 70–99)
GLUCOSE SERPL-MCNC: 74 MG/DL — SIGNIFICANT CHANGE UP (ref 70–99)
GLUCOSE SERPL-MCNC: 74 MG/DL — SIGNIFICANT CHANGE UP (ref 70–99)
GLUCOSE UR QL: NEGATIVE MG/DL — SIGNIFICANT CHANGE UP
GLUCOSE UR QL: NEGATIVE MG/DL — SIGNIFICANT CHANGE UP
HCT VFR BLD CALC: 36.1 % — SIGNIFICANT CHANGE UP (ref 34.5–45)
HCT VFR BLD CALC: 36.9 % — SIGNIFICANT CHANGE UP (ref 34.5–45)
HCT VFR BLD CALC: 38.2 % — SIGNIFICANT CHANGE UP (ref 34.5–45)
HGB BLD-MCNC: 11.8 G/DL — SIGNIFICANT CHANGE UP (ref 11.5–15.5)
HGB BLD-MCNC: 12.3 G/DL — SIGNIFICANT CHANGE UP (ref 11.5–15.5)
HGB BLD-MCNC: 12.5 G/DL — SIGNIFICANT CHANGE UP (ref 11.5–15.5)
IANC: 12.14 K/UL — HIGH (ref 1.8–7.4)
IMM GRANULOCYTES NFR BLD AUTO: 1.1 % — HIGH (ref 0–0.9)
INR BLD: 0.91 RATIO — SIGNIFICANT CHANGE UP (ref 0.85–1.18)
INR BLD: 0.95 RATIO — SIGNIFICANT CHANGE UP (ref 0.85–1.18)
INR BLD: 0.96 RATIO — SIGNIFICANT CHANGE UP (ref 0.85–1.18)
KETONES UR-MCNC: 40 MG/DL
KETONES UR-MCNC: >=160 MG/DL
LEUKOCYTE ESTERASE UR-ACNC: ABNORMAL
LEUKOCYTE ESTERASE UR-ACNC: ABNORMAL
LIDOCAIN IGE QN: 12 U/L — SIGNIFICANT CHANGE UP (ref 7–60)
LYMPHOCYTES # BLD AUTO: 0.69 K/UL — LOW (ref 1–3.3)
LYMPHOCYTES # BLD AUTO: 5.2 % — LOW (ref 13–44)
MCHC RBC-ENTMCNC: 28.9 PG — SIGNIFICANT CHANGE UP (ref 27–34)
MCHC RBC-ENTMCNC: 29 PG — SIGNIFICANT CHANGE UP (ref 27–34)
MCHC RBC-ENTMCNC: 29 PG — SIGNIFICANT CHANGE UP (ref 27–34)
MCHC RBC-ENTMCNC: 32.7 GM/DL — SIGNIFICANT CHANGE UP (ref 32–36)
MCHC RBC-ENTMCNC: 32.7 GM/DL — SIGNIFICANT CHANGE UP (ref 32–36)
MCHC RBC-ENTMCNC: 33.3 GM/DL — SIGNIFICANT CHANGE UP (ref 32–36)
MCV RBC AUTO: 87 FL — SIGNIFICANT CHANGE UP (ref 80–100)
MCV RBC AUTO: 88.4 FL — SIGNIFICANT CHANGE UP (ref 80–100)
MCV RBC AUTO: 88.7 FL — SIGNIFICANT CHANGE UP (ref 80–100)
MONOCYTES # BLD AUTO: 0.16 K/UL — SIGNIFICANT CHANGE UP (ref 0–0.9)
MONOCYTES NFR BLD AUTO: 1.2 % — LOW (ref 2–14)
NEUTROPHILS # BLD AUTO: 12.14 K/UL — HIGH (ref 1.8–7.4)
NEUTROPHILS NFR BLD AUTO: 92.2 % — HIGH (ref 43–77)
NITRITE UR-MCNC: NEGATIVE — SIGNIFICANT CHANGE UP
NITRITE UR-MCNC: NEGATIVE — SIGNIFICANT CHANGE UP
NRBC # BLD: 0 /100 WBCS — SIGNIFICANT CHANGE UP (ref 0–0)
NRBC # FLD: 0 K/UL — SIGNIFICANT CHANGE UP (ref 0–0)
PH UR: 5.5 — SIGNIFICANT CHANGE UP (ref 5–8)
PH UR: 5.5 — SIGNIFICANT CHANGE UP (ref 5–8)
PLATELET # BLD AUTO: 236 K/UL — SIGNIFICANT CHANGE UP (ref 150–400)
PLATELET # BLD AUTO: 243 K/UL — SIGNIFICANT CHANGE UP (ref 150–400)
PLATELET # BLD AUTO: 246 K/UL — SIGNIFICANT CHANGE UP (ref 150–400)
POTASSIUM SERPL-MCNC: 3.7 MMOL/L — SIGNIFICANT CHANGE UP (ref 3.5–5.3)
POTASSIUM SERPL-MCNC: 3.7 MMOL/L — SIGNIFICANT CHANGE UP (ref 3.5–5.3)
POTASSIUM SERPL-MCNC: 3.9 MMOL/L — SIGNIFICANT CHANGE UP (ref 3.5–5.3)
POTASSIUM SERPL-SCNC: 3.7 MMOL/L — SIGNIFICANT CHANGE UP (ref 3.5–5.3)
POTASSIUM SERPL-SCNC: 3.7 MMOL/L — SIGNIFICANT CHANGE UP (ref 3.5–5.3)
POTASSIUM SERPL-SCNC: 3.9 MMOL/L — SIGNIFICANT CHANGE UP (ref 3.5–5.3)
PROT SERPL-MCNC: 6.3 G/DL — SIGNIFICANT CHANGE UP (ref 6–8.3)
PROT SERPL-MCNC: 6.8 G/DL — SIGNIFICANT CHANGE UP (ref 6–8.3)
PROT SERPL-MCNC: 6.8 G/DL — SIGNIFICANT CHANGE UP (ref 6–8.3)
PROT UR-MCNC: 30 MG/DL
PROT UR-MCNC: 30 MG/DL
PROTHROM AB SERPL-ACNC: 10.3 SEC — SIGNIFICANT CHANGE UP (ref 9.5–13)
PROTHROM AB SERPL-ACNC: 10.7 SEC — SIGNIFICANT CHANGE UP (ref 9.5–13)
PROTHROM AB SERPL-ACNC: 10.9 SEC — SIGNIFICANT CHANGE UP (ref 9.5–13)
RBC # BLD: 4.07 M/UL — SIGNIFICANT CHANGE UP (ref 3.8–5.2)
RBC # BLD: 4.24 M/UL — SIGNIFICANT CHANGE UP (ref 3.8–5.2)
RBC # BLD: 4.32 M/UL — SIGNIFICANT CHANGE UP (ref 3.8–5.2)
RBC # FLD: 14.6 % — HIGH (ref 10.3–14.5)
RBC # FLD: 14.6 % — HIGH (ref 10.3–14.5)
RBC # FLD: 14.8 % — HIGH (ref 10.3–14.5)
RBC CASTS # UR COMP ASSIST: 1 /HPF — SIGNIFICANT CHANGE UP (ref 0–4)
RBC CASTS # UR COMP ASSIST: 3 /HPF — SIGNIFICANT CHANGE UP (ref 0–4)
REVIEW: SIGNIFICANT CHANGE UP
REVIEW: SIGNIFICANT CHANGE UP
RH IG SCN BLD-IMP: POSITIVE — SIGNIFICANT CHANGE UP
SODIUM SERPL-SCNC: 137 MMOL/L — SIGNIFICANT CHANGE UP (ref 135–145)
SODIUM SERPL-SCNC: 138 MMOL/L — SIGNIFICANT CHANGE UP (ref 135–145)
SODIUM SERPL-SCNC: 138 MMOL/L — SIGNIFICANT CHANGE UP (ref 135–145)
SP GR SPEC: 1.03 — HIGH (ref 1–1.03)
SP GR SPEC: 1.03 — SIGNIFICANT CHANGE UP (ref 1–1.03)
SQUAMOUS # UR AUTO: 12 /HPF — HIGH (ref 0–5)
SQUAMOUS # UR AUTO: 28 /HPF — HIGH (ref 0–5)
UROBILINOGEN FLD QL: 1 MG/DL — SIGNIFICANT CHANGE UP (ref 0.2–1)
UROBILINOGEN FLD QL: 1 MG/DL — SIGNIFICANT CHANGE UP (ref 0.2–1)
WBC # BLD: 13.16 K/UL — HIGH (ref 3.8–10.5)
WBC # BLD: 8.69 K/UL — SIGNIFICANT CHANGE UP (ref 3.8–10.5)
WBC # BLD: 9.66 K/UL — SIGNIFICANT CHANGE UP (ref 3.8–10.5)
WBC # FLD AUTO: 13.16 K/UL — HIGH (ref 3.8–10.5)
WBC # FLD AUTO: 8.69 K/UL — SIGNIFICANT CHANGE UP (ref 3.8–10.5)
WBC # FLD AUTO: 9.66 K/UL — SIGNIFICANT CHANGE UP (ref 3.8–10.5)
WBC UR QL: 54 /HPF — HIGH (ref 0–5)
WBC UR QL: 77 /HPF — HIGH (ref 0–5)

## 2024-02-19 PROCEDURE — 76946 ECHO GUIDE FOR AMNIOCENTESIS: CPT | Mod: 26

## 2024-02-19 PROCEDURE — 76818 FETAL BIOPHYS PROFILE W/NST: CPT | Mod: 26

## 2024-02-19 RX ORDER — SODIUM CHLORIDE 9 MG/ML
1000 INJECTION, SOLUTION INTRAVENOUS
Refills: 0 | Status: DISCONTINUED | OUTPATIENT
Start: 2024-02-19 | End: 2024-02-20

## 2024-02-19 RX ORDER — PANTOPRAZOLE SODIUM 20 MG/1
0 TABLET, DELAYED RELEASE ORAL
Refills: 0 | DISCHARGE

## 2024-02-19 RX ORDER — CHLORHEXIDINE GLUCONATE 213 G/1000ML
1 SOLUTION TOPICAL DAILY
Refills: 0 | Status: DISCONTINUED | OUTPATIENT
Start: 2024-02-19 | End: 2024-02-19

## 2024-02-19 RX ORDER — OXYTOCIN 10 UNIT/ML
VIAL (ML) INJECTION
Qty: 20 | Refills: 0 | Status: DISCONTINUED | OUTPATIENT
Start: 2024-02-19 | End: 2024-02-19

## 2024-02-19 RX ORDER — CEFTRIAXONE 500 MG/1
0 INJECTION, POWDER, FOR SOLUTION INTRAMUSCULAR; INTRAVENOUS
Refills: 0 | DISCHARGE

## 2024-02-19 RX ORDER — SODIUM CHLORIDE 9 MG/ML
1000 INJECTION, SOLUTION INTRAVENOUS
Refills: 0 | Status: DISCONTINUED | OUTPATIENT
Start: 2024-02-19 | End: 2024-02-19

## 2024-02-19 RX ORDER — ACETAMINOPHEN 500 MG
1000 TABLET ORAL ONCE
Refills: 0 | Status: COMPLETED | OUTPATIENT
Start: 2024-02-19 | End: 2024-02-19

## 2024-02-19 RX ORDER — INFLUENZA VIRUS VACCINE 15; 15; 15; 15 UG/.5ML; UG/.5ML; UG/.5ML; UG/.5ML
0.5 SUSPENSION INTRAMUSCULAR ONCE
Refills: 0 | Status: DISCONTINUED | OUTPATIENT
Start: 2024-02-19 | End: 2024-02-21

## 2024-02-19 RX ORDER — SODIUM CHLORIDE 9 MG/ML
1000 INJECTION, SOLUTION INTRAVENOUS ONCE
Refills: 0 | Status: COMPLETED | OUTPATIENT
Start: 2024-02-19 | End: 2024-02-19

## 2024-02-19 RX ORDER — CHLORHEXIDINE GLUCONATE 213 G/1000ML
1 SOLUTION TOPICAL DAILY
Refills: 0 | Status: DISCONTINUED | OUTPATIENT
Start: 2024-02-19 | End: 2024-02-21

## 2024-02-19 RX ORDER — ACETAMINOPHEN 500 MG
2 TABLET ORAL
Qty: 0 | Refills: 0 | DISCHARGE

## 2024-02-19 RX ORDER — CEPHALEXIN 500 MG
1 CAPSULE ORAL
Refills: 0 | DISCHARGE

## 2024-02-19 RX ADMIN — Medication 1000 MILLIGRAM(S): at 22:29

## 2024-02-19 RX ADMIN — Medication 12 MILLIGRAM(S): at 14:10

## 2024-02-19 RX ADMIN — SODIUM CHLORIDE 125 MILLILITER(S): 9 INJECTION, SOLUTION INTRAVENOUS at 11:35

## 2024-02-19 RX ADMIN — SODIUM CHLORIDE 1000 MILLILITER(S): 9 INJECTION, SOLUTION INTRAVENOUS at 10:25

## 2024-02-19 RX ADMIN — Medication 400 MILLIGRAM(S): at 22:21

## 2024-02-19 RX ADMIN — CHLORHEXIDINE GLUCONATE 1 APPLICATION(S): 213 SOLUTION TOPICAL at 22:21

## 2024-02-19 RX ADMIN — SODIUM CHLORIDE 75 MILLILITER(S): 9 INJECTION, SOLUTION INTRAVENOUS at 19:20

## 2024-02-19 NOTE — OB RN PATIENT PROFILE - DOES PATIENT HAVE ADVANCE DIRECTIVE
Spoke to patient and provided MD message. Patient verbalized understanding.     She will call to schedule appt after she leaves from work today   Yes

## 2024-02-19 NOTE — OB PROVIDER TRIAGE NOTE - NS_OBGYNHISTORY_OBGYN_ALL_OB_FT
sab x1  top x3  FT  11 wt. [id number]   FT  22 wt. 7lbs 3oz  (4 d&c) OB HX:   2011  FT 7#6  2022  FT 7#3  ETOP x's 3   SAB x's 1 D&C   GYN HX: denies

## 2024-02-19 NOTE — OB PROVIDER H&P - NSHPLABSRESULTS_GEN_ALL_CORE
CBC  CBC Full  -  ( 19 Feb 2024 10:37 )  WBC Count : 8.69 K/uL  RBC Count : 4.07 M/uL  Hemoglobin : 11.8 g/dL  Hematocrit : 36.1 %  Platelet Count - Automated : 243 K/uL  Mean Cell Volume : 88.7 fL  Mean Cell Hemoglobin : 29.0 pg  Mean Cell Hemoglobin Concentration : 32.7 gm/dL  Auto Neutrophil # : x  Auto Lymphocyte # : x  Auto Monocyte # : x  Auto Eosinophil # : x  Auto Basophil # : x  Auto Neutrophil % : x  Auto Lymphocyte % : x  Auto Monocyte % : x  Auto Eosinophil % : x  Auto Basophil % : x    CMP  02-19    138  |  104  |  4<L>  ----------------------------<  74  3.7   |  21<L>  |  0.69    Ca    8.7      19 Feb 2024 10:37    TPro  6.3  /  Alb  3.2<L>  /  TBili  0.5  /  DBili  x   /  AST  35<H>  /  ALT  35<H>  /  AlkPhos  98  02-19    amylase/ lipase  amylase: 48  lipase: 12    Urinalysis Basic - ( 19 Feb 2024 10:37 )    Color: x / Appearance: x / SG: x / pH: x  Gluc: 74 mg/dL / Ketone: x  / Bili: x / Urobili: x   Blood: x / Protein: x / Nitrite: x   Leuk Esterase: x / RBC: x / WBC x   Sq Epi: x / Non Sq Epi: x / Bacteria: x    urine culture

## 2024-02-19 NOTE — CHART NOTE - NSCHARTNOTEFT_GEN_A_CORE
PROCEDURE NOTE - AMNIOREDUCTION     SVE prior to procedure closed/long (3:30pm).   Risks of the procedure were discussed with the patient and she signed consent in the presence of a witness.   Patient was placed in the dorsal supine position with left lateral tilt.  Abdomen was prepped and draped in the usual fashion.  A timeout was performed to ensure correct patient and procedure.  Under ultrasound guidance, a 20G spinal needle was placed into the left upper quadrant of the patient's abdomen through the abdominal wall and into the uterus.  The needle tip was visualized with ultrasound throughout the procedure away from the placenta and the fetus.  2100cc of amniotic fluid was drained using tubing and vacu-tainer.  Needle was removed without issue and hemostasis was noted.      See AS report/images.  Dr. Castillo was present for the entire procedure.     Patient had regular contractions throughout the procedure and during immediate monitoring postop, which had dissipated over 20 minutes.  Post-procedure BPP 6/10 for breathing.    Plan for continuous EFM/TOCO overnight, repeat CBC, CMP, coags now and in AM or sooner if change in clinical status.      Cheyenne Pascual MD PGY-5 PROCEDURE NOTE - AMNIOREDUCTION     SVE prior to procedure closed/long (3:30pm).   Risks of the procedure were discussed with the patient and she signed consent in the presence of a witness.   Patient was placed in the dorsal supine position with left lateral tilt.  Abdomen was prepped and draped in the usual fashion.  A timeout was performed to ensure correct patient and procedure.  Under ultrasound guidance, a 20G spinal needle was placed into the left upper quadrant of the patient's abdomen through the abdominal wall and into the uterus.  The needle tip was visualized with ultrasound throughout the procedure away from the placenta and the fetus.  2100cc of amniotic fluid was drained using tubing and vacu-tainer.  Needle was removed without issue and hemostasis was noted.      See AS report/images.  Dr. Castillo was present for the entire procedure.     Patient had regular contractions throughout the procedure and during immediate monitoring postop, which had dissipated over 20 minutes.  Post-procedure BPP 6/10 for breathing.    Plan for continuous EFM/TOCO overnight, repeat CBC, CMP, coags now and in AM or sooner if change in clinical status.      Cheyenne Pascual MD PGY-5    ---------------------------------------  MFM Attending Note    Patient was counseled on risk, benefits, and alternatives of amnioreduction.   Time out was called and confirmed patient and procedure.  Procedure was completed with sterile technique under continuous ultrasound guidance.  Patient did have some contractions during procedure but was able to tolerated for procedure completion.  2100 mL of CLEAR amniotic fluid was removed in total.    I was present and assisted throughout entire procedure.    Patient for continued fetal monitoring in PACU overnight. Fetus became vigorous after procedure and has been difficult to monitor but will attempt to obtain tracing at intervals throughout the night. Will continue trending labs.

## 2024-02-19 NOTE — OB RN TRIAGE NOTE - NSMATERNALFETALCONCERNS_OBGYN_ALL_OB_FT
Fetal Alert  24 - Severe polyhydramnios at 29 weeks. 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 Suggest an inpatient nonurgent echocardiogram on the baby after birth and prior to discharge from the nursery. If the baby is placed in the  intensive care unit for noncardiac reasons, then a nonurgent echocardiogram should be performed in the first 24 to 48 hours unless any other clinical concerns arise.See echo for FULL report. Diya Dubois RN.

## 2024-02-19 NOTE — OB PROVIDER TRIAGE NOTE - NSHPPHYSICALEXAM_GEN_ALL_CORE
abdomen: soft, nt on palp  SSE; cervix appears closed and posterior   SVE pt refused   TVS: 4.56- 4.93 cm no dynamic changes   T(C): 36.7 (02-19-24 @ 10:07), Max: 36.7 (02-19-24 @ 09:56)  HR: 97 (02-19-24 @ 10:22) (97 - 107)  BP: 103/70 (02-19-24 @ 10:22) (103/70 - 107/76)  RR: 17 (02-19-24 @ 09:56) (17 - 17)  SpO2: -- abdomen: soft, nt on palp  SSE; cervix appears closed and posterior   scant amount yeast noted, pt states just treated for yeast infection   SVE pt refused   TVS: 4.56- 4.93 cm no dynamic changes   T(C): 36.7 (02-19-24 @ 10:07), Max: 36.7 (02-19-24 @ 09:56)  HR: 97 (02-19-24 @ 10:22) (97 - 107)  BP: 103/70 (02-19-24 @ 10:22) (103/70 - 107/76)  RR: 17 (02-19-24 @ 09:56) (17 - 17)  SpO2: -- abdomen: soft, nt on palp  SSE; cervix appears closed and posterior   scant amount yeast noted, pt states just treated for yeast infection   SVE pt refused   TVS: 4.56- 4.93 cm no dynamic changes   sono images saved in ASOB   T(C): 36.7 (02-19-24 @ 10:07), Max: 36.7 (02-19-24 @ 09:56)  HR: 97 (02-19-24 @ 10:22) (97 - 107)  BP: 103/70 (02-19-24 @ 10:22) (103/70 - 107/76)  RR: 17 (02-19-24 @ 09:56) (17 - 17)  SpO2: --    addendum @ 1320:   FH tracing discontinuous secondary to severe poly hydraminos   FH baseline 135 FH variability mod +Fh accels no FH decels   toco: every 3-4 minutes     Dr Castillo and Dr Pascual @ bedside   TAS: BPP: 8/8 vtx FELIZ: 80 anterior placenta

## 2024-02-19 NOTE — OB RN TRIAGE NOTE - NSICDXPASTMEDICALHX_GEN_ALL_CORE_FT
Requested Prescriptions   Pending Prescriptions Disp Refills     atorvastatin (LIPITOR) 40 MG tablet 90 tablet 3     Sig: Take 1 tablet (40 mg) by mouth daily       There is no refill protocol information for this order      Last Written Prescription Date:  10/18/2019  Last Fill Quantity: 90,  # refills: 3   Last office visit: 10/18/2019 with prescribing provider:  you   Future Office Visit:   Next 5 appointments (look out 90 days)    Oct 26, 2020 11:40 AM  PHYSICAL with Jennifer Dalton NP  [facility] ([facility]) [address] [place] [id number] [phone]                   
"Requested Prescriptions   Signed Prescriptions Disp Refills    atorvastatin (LIPITOR) 40 MG tablet 90 tablet 0     Sig: Take 1 tablet (40 mg) by mouth daily       Statins Protocol Passed - 10/6/2020 12:16 PM        Passed - LDL on file in past 12 months     Recent Labs   Lab Test 10/11/19  0740   LDL 96             Passed - No abnormal creatine kinase in past 12 months     No lab results found.             Passed - Recent (12 mo) or future (30 days) visit within the authorizing provider's specialty     Patient has had an office visit with the authorizing provider or a provider within the authorizing providers department within the previous 12 mos or has a future within next 30 days. See \"Patient Info\" tab in inbasket, or \"Choose Columns\" in Meds & Orders section of the refill encounter.              Passed - Medication is active on med list        Passed - Patient is age 18 or older        Passed - No active pregnancy on record        Passed - No positive pregnancy test in past 12 months               Prescription approved per [facility] Refill Protocol.    Jessenia Kruse RN    "
PAST MEDICAL HISTORY:  Umbilical hernia without obstruction or gangrene

## 2024-02-19 NOTE — CONSULT NOTE PEDS - SUBJECTIVE AND OBJECTIVE BOX
Ms. Del Cid is a 34 y/o  admitted at 32w2d gestational age, presenting with concern for contractions. Prenatal history notable for severe polyhydramnios (FELIZ 80 today). s/p MFM discussion for etiologies of polyhydramnios, including gastrointestinal obstruction. fetal echo with VSD and amniocentesis with additional testing that had normal result. Most recent EFW 1477 g on . Plan for betamethasone and amnioreduction. NICU consulted to discuss what to expect if she were to deliver at 32 weeks gestation.    I met with Ms. Del Cid and we reviewed the followin. The NICU team will be present at her delivery and will immediately assess and care for her infant.    2. Due to prematurity, the infant may require respiratory support, most commonly in the form of CPAP. The outcomes improve after  steroids. Less commonly, some infants at this gestational age will require intubation and mechanical ventilation with surfactant administration.    3. Depending on the clinical status of the infant, enteral feedings may not be started immediately. Evaluation will be performed for any GI pathology as clinically indicated. IV nutrition in the form of TPN would be provided via umbilical line or PICC until the infant is able to tolerate full enteral feedings. Due to immature suck/swallow, he may require an orogastric tube once feeds are initiated.     4. Discussed the benefits of breastfeeding in  infants and encouraged mother to pump following delivery.    5. Due to prematurity, the infant will be at increased risk for infection and would likely be started on antibiotics after birth. The infant will be screened for infections following delivery. If the infant shows signs and symptoms of feeding intolerance, feedings may be held, and the infant may require evaluation for intestinal infection (necrotizing enterocolitis).    6. Small risk of IVH discussed.    7. VSD evaluation will be performed as clinically indicated.     8. Possible ROP risk discussed along with close follow up with ophthalmologist.    9. Thermoregulation issues and need to be in an isolette with slow weaning to a crib discussed.    10. Length of stay is highly variable, but given the infant’s size and gestational age, average stay reviewed. Discussed discharged criteria.    Ms. Del Cid had the chance to ask any questions and was encouraged to contact the NICU at that time if additional questions arise.    Thank you for the opportunity to participate in the care of this patient and please inform us of any changes in her status.    Ewelina Argueta, PGY-5  NICU Fellow

## 2024-02-19 NOTE — OB RN PATIENT PROFILE - NSMATERNALFETALCONCERNS_OBGYN_ALL_OB_FT
Fetal Alert  24 - Severe polyhydramnios at 29 weeks. 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 Suggest an inpatient nonurgent echocardiogram on the baby after birth and prior to discharge from the nursery. If the baby is placed in the  intensive care unit for noncardiac reasons, then a nonurgent echocardiogram should be performed in the first 24 to 48 hours unless any other clinical concerns arise.See echo for FULL report. Diya Dubois RN.

## 2024-02-19 NOTE — OB PROVIDER H&P - HISTORY OF PRESENT ILLNESS
32 y/o  @ 32.2 wks gestation presents with c/o painful uterine contractions every 4 minutes since 0200 states her pain is 10/10 on pain scale denies any LOF or VB reports +FM denies any n/v/ d denies any fever or chills pt states last ate @ 1100 on 2024 states just not hungry but taking in po fluids denies any hematuria or dysuria denies any recent inter course ap care comp by :   Fetal Alert  24 - Severe polyhydramnios at 29 weeks. 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 -Nissa Dumont RNC  24- Fetal echo done 24.A large posterior muscular ventricular septal defect with membranous extension and bidirectional shunt is seen.Otherwise normal study.If there are no other clinical concerns at birth, the baby may be roomed in with the mother in the regular nursery and discharged with her. Suggest an inpatient nonurgent echocardiogram on the baby after birth and prior to discharge from the nursery. If the baby is placed in the  intensive care unit for noncardiac reasons, then a nonurgent echocardiogram should be performed in the first 24 to 48 hours unless any other clinical concerns arise.See echo for FULL report. Diya Dubois RN.

## 2024-02-19 NOTE — CONSULT NOTE ADULT - ATTENDING COMMENTS
SMITHM Attending Note    A separate documentation was made earlier today by myself related to this patients care. Please see for additional information about her care.    Patient for continued in hospital evaluation for unclear etiology of severe fetal polyhydramnios.  Betamethasone course in process. Repeat labs later today.  Patient to have amnioreduction today to improve symptoms. Goal to removed 2-3 liters if patient tolerates.  Anesthesia, NICU, primary OB, and safety officers aware of patient and planned procedure.

## 2024-02-19 NOTE — OB PROVIDER H&P - NSHPPHYSICALEXAM_GEN_ALL_CORE
abdomen: soft, nt on palp  SSE; cervix appears closed and posterior   scant amount yeast noted, pt states just treated for yeast infection   SVE pt refused   TVS: 4.56- 4.93 cm no dynamic changes   sono images saved in ASOB   T(C): 36.7 (02-19-24 @ 10:07), Max: 36.7 (02-19-24 @ 09:56)  HR: 97 (02-19-24 @ 10:22) (97 - 107)  BP: 103/70 (02-19-24 @ 10:22) (103/70 - 107/76)  RR: 17 (02-19-24 @ 09:56) (17 - 17)  SpO2: --    addendum @ 1320:   FH tracing discontinuous secondary to severe poly hydraminos   FH baseline 135 FH variability mod +Fh accels no FH decels   toco: every 3-4 minutes     Dr Castillo and Dr Pascual @ bedside   TAS: BPP: 8/8 vtx FELIZ: 80 anterior placenta

## 2024-02-19 NOTE — CHART NOTE - NSCHARTNOTEFT_GEN_A_CORE
MFM Attending Note  This is a brief documentation secondary to planning procedure. Full consultation to be completed later today.     Patient presented for triage evaluation for increased discomfort and contraction like pain. Patient reports difficulty sleeping and some shortness of breathing with laying down. She feels her abdomen just keeps getting bigger. Her skin also feels tender because of the fluid.  This pregnancy patient is  at 32w2d followed for severe polyhydramnios since 29 weeks. Her most recent FELIZ 58 cm and last fetal growth 24 1477 grams (44% with AC 32%). Patient had fetal echo with VSD and amniocentesis with additional testing that had normal result. Patient states she pasted her glucose testing for diabetes. Patient had a normal anatomy ultrasound.  To this point no clear etiology has been found of the polyhydramnios.    Patient seen at bedside for ultrasound with finding of FELIZ 80 cm.  Fetus with vigorous movement noted throughout the ultrasound. Difficult to fully assess fetus because of large amount of amniotic fluid. A fetal bladder was seen and small stomach noted.  Patient had prior bedside transvaginal ultrasound with normal cervical length.     Patient explained with significant polyhydramnios for maternal symptoms that amnioreduction would be recommended.  We also discussed that betamethasone should be given since there is a risk of  labor, premature rupture of membranes, placental abruption, and fetal distress associated with amnioreduction. Anesthesia and NICU consultation will also be completed prior to procedure so all teams are aware and patient is able to receive all information.  Labs sent at presentation notable for AST/ALT 35/35 however patient denies any emesis and abdominal discomfort with eating secondary to gravid uterus. Will trend labs, blood pressures normal. Baseline Coags pending.  Patient also aware that because of the potential complications related to this procedure that in hospital monitoring would be recommended until at least tomorrow. NST today noted contractions but fetal status overall appropriate for gestation although difficult to consistently trace and will have additional monitoring prior to procedure.    Primary OB aware of patient plan for procedure. Safety officers in hospital also notified.

## 2024-02-19 NOTE — OB RN TRIAGE NOTE - FALL HARM RISK - UNIVERSAL INTERVENTIONS
Bed in lowest position, wheels locked, appropriate side rails in place/Call bell, personal items and telephone in reach/Instruct patient to call for assistance before getting out of bed or chair/Non-slip footwear when patient is out of bed/[place] to call system/Physically safe environment - no spills, clutter or unnecessary equipment/Purposeful Proactive Rounding/Room/bathroom lighting operational, light cord in reach

## 2024-02-19 NOTE — OB RN PATIENT PROFILE - FALL HARM RISK - UNIVERSAL INTERVENTIONS
Bed in lowest position, wheels locked, appropriate side rails in place/Call bell, personal items and telephone in reach/Instruct patient to call for assistance before getting out of bed or chair/Non-slip footwear when patient is out of bed/[place] to call system/Physically safe environment - no spills, clutter or unnecessary equipment/Purposeful Proactive Rounding/Room/bathroom lighting operational, light cord in reach

## 2024-02-19 NOTE — OB PROVIDER H&P - ASSESSMENT
pt seen and evaluated with dr snider/ dr lewis   admit to l&D  severe polyhydraminos @ 32.2 wks gestation for amnioreduction/ betamethasone   see admission orders pt seen and evaluated with dr snider/ dr lewis   plan of care d/w dr Gregory   admit to l&D  severe polyhydraminos @ 32.2 wks gestation for amnioreduction/ betamethasone   see admission orders

## 2024-02-19 NOTE — OB PROVIDER TRIAGE NOTE - NSMATERNALFETALCONCERNS_OBGYN_ALL_OB_FT
Fetal Alert  24 - Severe polyhydramnios at 29 weeks. 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 Suggest an inpatient nonurgent echocardiogram on the baby after birth and prior to discharge from the nursery. If the baby is placed in the  intensive care unit for noncardiac reasons, then a nonurgent echocardiogram should be performed in the first 24 to 48 hours unless any other clinical concerns arise.See echo for FULL report. Diya Dubois RN.

## 2024-02-19 NOTE — OB PROVIDER TRIAGE NOTE - HISTORY OF PRESENT ILLNESS
34 y/o  @ 32.2 wks gestation presents with c/o painful uterine contractions every 4 minutes since 0200  32 y/o  @ 32.2 wks gestation presents with c/o painful uterine contractions every 4 minutes since 0200 states her pain is 10/10 on pain scale denies any LOF or VB reports +FM denies any n/v/ d denies any fever or chills pt states last ate @ 1100 on 2024 states just not hungry but taking in po fluids denies any hematuria or dysuria denies any recent inter course ap care comp by :   Fetal Alert  24 - Severe polyhydramnios at 29 weeks. 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 -Nissa Dumont RNC  24- Fetal echo done 24.A large posterior muscular ventricular septal defect with membranous extension and bidirectional shunt is seen.Otherwise normal study.If there are no other clinical concerns at birth, the baby may be roomed in with the mother in the regular nursery and discharged with her. Suggest an inpatient nonurgent echocardiogram on the baby after birth and prior to discharge from the nursery. If the baby is placed in the  intensive care unit for noncardiac reasons, then a nonurgent echocardiogram should be performed in the first 24 to 48 hours unless any other clinical concerns arise.See echo for FULL report. Diya Dubois RN.

## 2024-02-19 NOTE — OB PROVIDER TRIAGE NOTE - NSHPLABSRESULTS_GEN_ALL_CORE
CBC  CMP  amylase/ lipase  urinalysis CBC  CBC Full  -  ( 19 Feb 2024 10:37 )  WBC Count : 8.69 K/uL  RBC Count : 4.07 M/uL  Hemoglobin : 11.8 g/dL  Hematocrit : 36.1 %  Platelet Count - Automated : 243 K/uL  Mean Cell Volume : 88.7 fL  Mean Cell Hemoglobin : 29.0 pg  Mean Cell Hemoglobin Concentration : 32.7 gm/dL  Auto Neutrophil # : x  Auto Lymphocyte # : x  Auto Monocyte # : x  Auto Eosinophil # : x  Auto Basophil # : x  Auto Neutrophil % : x  Auto Lymphocyte % : x  Auto Monocyte % : x  Auto Eosinophil % : x  Auto Basophil % : x    CMP  02-19    138  |  104  |  4<L>  ----------------------------<  74  3.7   |  21<L>  |  0.69    Ca    8.7      19 Feb 2024 10:37    TPro  6.3  /  Alb  3.2<L>  /  TBili  0.5  /  DBili  x   /  AST  35<H>  /  ALT  35<H>  /  AlkPhos  98  02-19    amylase/ lipase  urinalysis  Urinalysis Basic - ( 19 Feb 2024 10:37 )    urine culture CBC  CBC Full  -  ( 19 Feb 2024 10:37 )  WBC Count : 8.69 K/uL  RBC Count : 4.07 M/uL  Hemoglobin : 11.8 g/dL  Hematocrit : 36.1 %  Platelet Count - Automated : 243 K/uL  Mean Cell Volume : 88.7 fL  Mean Cell Hemoglobin : 29.0 pg  Mean Cell Hemoglobin Concentration : 32.7 gm/dL  Auto Neutrophil # : x  Auto Lymphocyte # : x  Auto Monocyte # : x  Auto Eosinophil # : x  Auto Basophil # : x  Auto Neutrophil % : x  Auto Lymphocyte % : x  Auto Monocyte % : x  Auto Eosinophil % : x  Auto Basophil % : x    CMP  02-19    138  |  104  |  4<L>  ----------------------------<  74  3.7   |  21<L>  |  0.69    Ca    8.7      19 Feb 2024 10:37    TPro  6.3  /  Alb  3.2<L>  /  TBili  0.5  /  DBili  x   /  AST  35<H>  /  ALT  35<H>  /  AlkPhos  98  02-19    amylase/ lipase  amylase: 48  lipase: 12   urinalysis CBC  CBC Full  -  ( 19 Feb 2024 10:37 )  WBC Count : 8.69 K/uL  RBC Count : 4.07 M/uL  Hemoglobin : 11.8 g/dL  Hematocrit : 36.1 %  Platelet Count - Automated : 243 K/uL  Mean Cell Volume : 88.7 fL  Mean Cell Hemoglobin : 29.0 pg  Mean Cell Hemoglobin Concentration : 32.7 gm/dL  Auto Neutrophil # : x  Auto Lymphocyte # : x  Auto Monocyte # : x  Auto Eosinophil # : x  Auto Basophil # : x  Auto Neutrophil % : x  Auto Lymphocyte % : x  Auto Monocyte % : x  Auto Eosinophil % : x  Auto Basophil % : x    CMP  02-19    138  |  104  |  4<L>  ----------------------------<  74  3.7   |  21<L>  |  0.69    Ca    8.7      19 Feb 2024 10:37    TPro  6.3  /  Alb  3.2<L>  /  TBili  0.5  /  DBili  x   /  AST  35<H>  /  ALT  35<H>  /  AlkPhos  98  02-19    amylase/ lipase  amylase: 48  lipase: 12    Urinalysis Basic - ( 19 Feb 2024 10:37 )    Color: x / Appearance: x / SG: x / pH: x  Gluc: 74 mg/dL / Ketone: x  / Bili: x / Urobili: x   Blood: x / Protein: x / Nitrite: x   Leuk Esterase: x / RBC: x / WBC x   Sq Epi: x / Non Sq Epi: x / Bacteria: x    urine culture

## 2024-02-19 NOTE — OB PROVIDER TRIAGE NOTE - NSOBPROVIDERNOTE_OBGYN_ALL_OB_FT
IVLR 1000 milliliters bolus over 60 minutes IVLR 1000 milliliters bolus over 60 minutes    pt seen and evaluated with dr snider/ dr lewis   admit to l&D  severe polyhydraminos @ 32.2 wks gestation for amnioreduction  see admission orders

## 2024-02-19 NOTE — CONSULT NOTE ADULT - SUBJECTIVE AND OBJECTIVE BOX
MFM CONSULT NOTE     HPI: 32yo  at 32w2d presented with regular contractions since last night.  She denies leakage of fluid, vaginal bleeding, decreased fetal movement.  She initially presented with inability to eat since yesterday because of contraction pain, last PO intake yesterday 11am, now improving s/p IV fluids and feels hungry.  Reports some shortness of breath when laying down.      This pregnancy has been complicated by severe polyhydramnios initially seen at 29w, unclear etiology with only anatomic abnormality visualized thus far is large VSD.     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        HISTORIES:   OB:  (Full term  x2 uncomplicated ~7lb for both, SABx1 D&C, IAB x3 all D&C)  - Same partner as last delivery  GYN: denies  PMH: denies  PSH: inguinal hernia repair  ALL: NKDA    Vital Signs Last 24 Hours  T(C): 36.7 (24 @ 10:07), Max: 36.7 (24 @ 09:56)  HR: 97 (24 @ 10:22) (97 - 107)  BP: 103/70 (24 @ 10:22) (103/70 - 107/76)  RR: 17 (24 @ 09:56) (17 - 17)    Physical Exam:  General: uncomfortable appearing with contractions   Abdomen: gravid and distended from amniotic fluid, skin tense  Ext: No edema or tenderness     NST discontinuous, likely from severe polyhydraminos  TOCO: contractions q 3-6 minutes    Ultrasound: see AS for report   Variable presentation, FELIZ 80  BPP 8/8      Labs:             11.8   8.69  )-----------( 243      (  @ 10:37 )             36.1      @ 10:37    138  |  104  |  4   ----------------------------<  74  3.7   |  21  |  0.69    Ca    8.7       @ 10:37    TPro  6.3  /  Alb  3.2  /  TBili  0.5  /  DBili  x   /  AST  35  /  ALT  35  /  AlkPhos  98   @ 10:37      MEDICATIONS  (STANDING):  betamethasone Injectable 12 milliGRAM(s) IntraMuscular every 24 hours  chlorhexidine 2% Cloths 1 Application(s) Topical daily  lactated ringers. 1000 milliLiter(s) (125 mL/Hr) IV Continuous <Continuous>  lactated ringers. 1000 milliLiter(s) (125 mL/Hr) IV Continuous <Continuous>  oxytocin Infusion  milliUNIT(s)/Min (1000 mL/Hr) IV Continuous <Continuous>

## 2024-02-19 NOTE — CONSULT NOTE ADULT - ASSESSMENT
34yo  at 32w2d presented with contractions, ruled out for labor.  VS wnl, labs without concerning findings in the setting of anhedonia (resolved).  Shortness of breath, contractions, and anhedonia likely in the setting of uterine distension from severe polyhydramnios (FELIZ 80 today).      We reviewed possible etiologies of polyhydramnios including idiopathic, diabetes, viral infections, nonimmune hydrops, isoimmunization, chromosomal abnormalities and fetal anomalies such as open neural tube defects, cardiac diseases, muscular disorders, and gastrointestinal obstruction.  She screened negative for gestational diabetes and the fetus is appropriately grown.  There is a known large muscular VSD with membranous component that we discussed will be followed up with a  echo.  There were no concerning findings for TE fistula at the time of anatomy; however, it was difficult to fully evaluate fetal anatomy today given the amount of amniotic fluid.      The increased risk for  labor, malpresentation, cord prolapse, placental abruption, postpartum hemorrhage and  delivery were discussed.  She has had weekly NSTs/BPPs outpatient with appropriate findings.      Given the severity of the polyhydramnios and accompanying symptoms, we discussed amnioreduction with the patient with risks involved, including PPROM,  labor, and abruption; possible need for emergent delivery.  She agrees to the procedure.  We recommended betamethasone for fetal lung maturity given  contractions and in the setting of the amnioreduction.  She understands the risks of the amnireduction and signed consent for the procedure.     - Follow up urine culture  - Follow up GBS   - Betamethasone   - NICU consult  - Anesthesia consult  - Follow up remainder of labs (T&S, coags)  - NPO with plan for amnioreduction today  - Continuous monitoring and BPP after amnioreduction   - Admission for overnight monitoring for signs above and second dose of betamethasone tomorrow    Cheyenne Pascual MD PGY-5 MFM fellow  Attending: Dr. Castillo

## 2024-02-19 NOTE — OB PROVIDER H&P - NSMATERNALFETALCONCERNS_OBGYN_ALL_OB_FT
Fetal Alert  24 - Severe polyhydramnios at 29 weeks. 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 Suggest an inpatient nonurgent echocardiogram on the baby after birth and prior to discharge from the nursery. If the baby is placed in the  intensive care unit for noncardiac reasons, then a nonurgent echocardiogram should be performed in the first 24 to 48 hours unless any other clinical concerns arise.See echo for FULL report. Diya Dubois RN.

## 2024-02-20 ENCOUNTER — APPOINTMENT (OUTPATIENT)
Dept: ANTEPARTUM | Facility: CLINIC | Age: 34
End: 2024-02-20
Payer: COMMERCIAL

## 2024-02-20 ENCOUNTER — ASOB RESULT (OUTPATIENT)
Age: 34
End: 2024-02-20

## 2024-02-20 LAB
ALBUMIN SERPL ELPH-MCNC: 3.3 G/DL — SIGNIFICANT CHANGE UP (ref 3.3–5)
ALP SERPL-CCNC: 111 U/L — SIGNIFICANT CHANGE UP (ref 40–120)
ALT FLD-CCNC: 42 U/L — HIGH (ref 4–33)
ANION GAP SERPL CALC-SCNC: 17 MMOL/L — HIGH (ref 7–14)
APTT BLD: 23.9 SEC — LOW (ref 24.5–35.6)
AST SERPL-CCNC: 40 U/L — HIGH (ref 4–32)
BASOPHILS # BLD AUTO: 0.02 K/UL — SIGNIFICANT CHANGE UP (ref 0–0.2)
BASOPHILS NFR BLD AUTO: 0.2 % — SIGNIFICANT CHANGE UP (ref 0–2)
BILIRUB SERPL-MCNC: 0.7 MG/DL — SIGNIFICANT CHANGE UP (ref 0.2–1.2)
BUN SERPL-MCNC: 6 MG/DL — LOW (ref 7–23)
CALCIUM SERPL-MCNC: 9.1 MG/DL — SIGNIFICANT CHANGE UP (ref 8.4–10.5)
CHLORIDE SERPL-SCNC: 103 MMOL/L — SIGNIFICANT CHANGE UP (ref 98–107)
CO2 SERPL-SCNC: 16 MMOL/L — LOW (ref 22–31)
CREAT SERPL-MCNC: 0.7 MG/DL — SIGNIFICANT CHANGE UP (ref 0.5–1.3)
EGFR: 116 ML/MIN/1.73M2 — SIGNIFICANT CHANGE UP
EOSINOPHIL # BLD AUTO: 0 K/UL — SIGNIFICANT CHANGE UP (ref 0–0.5)
EOSINOPHIL NFR BLD AUTO: 0 % — SIGNIFICANT CHANGE UP (ref 0–6)
GLUCOSE SERPL-MCNC: 103 MG/DL — HIGH (ref 70–99)
HCT VFR BLD CALC: 36.7 % — SIGNIFICANT CHANGE UP (ref 34.5–45)
HGB BLD-MCNC: 11.8 G/DL — SIGNIFICANT CHANGE UP (ref 11.5–15.5)
IANC: 9.62 K/UL — HIGH (ref 1.8–7.4)
IMM GRANULOCYTES NFR BLD AUTO: 1.5 % — HIGH (ref 0–0.9)
LYMPHOCYTES # BLD AUTO: 1.44 K/UL — SIGNIFICANT CHANGE UP (ref 1–3.3)
LYMPHOCYTES # BLD AUTO: 12.2 % — LOW (ref 13–44)
MCHC RBC-ENTMCNC: 29 PG — SIGNIFICANT CHANGE UP (ref 27–34)
MCHC RBC-ENTMCNC: 32.2 GM/DL — SIGNIFICANT CHANGE UP (ref 32–36)
MCV RBC AUTO: 90.2 FL — SIGNIFICANT CHANGE UP (ref 80–100)
MONOCYTES # BLD AUTO: 0.59 K/UL — SIGNIFICANT CHANGE UP (ref 0–0.9)
MONOCYTES NFR BLD AUTO: 5 % — SIGNIFICANT CHANGE UP (ref 2–14)
NEUTROPHILS # BLD AUTO: 9.62 K/UL — HIGH (ref 1.8–7.4)
NEUTROPHILS NFR BLD AUTO: 81.1 % — HIGH (ref 43–77)
NRBC # BLD: 0 /100 WBCS — SIGNIFICANT CHANGE UP (ref 0–0)
NRBC # FLD: 0 K/UL — SIGNIFICANT CHANGE UP (ref 0–0)
PLATELET # BLD AUTO: 257 K/UL — SIGNIFICANT CHANGE UP (ref 150–400)
POTASSIUM SERPL-MCNC: 4.1 MMOL/L — SIGNIFICANT CHANGE UP (ref 3.5–5.3)
POTASSIUM SERPL-SCNC: 4.1 MMOL/L — SIGNIFICANT CHANGE UP (ref 3.5–5.3)
PROT SERPL-MCNC: 6.7 G/DL — SIGNIFICANT CHANGE UP (ref 6–8.3)
RBC # BLD: 4.07 M/UL — SIGNIFICANT CHANGE UP (ref 3.8–5.2)
RBC # FLD: 14.8 % — HIGH (ref 10.3–14.5)
RUBV IGG SER-ACNC: 1.7 INDEX — SIGNIFICANT CHANGE UP
RUBV IGG SER-IMP: POSITIVE — SIGNIFICANT CHANGE UP
SODIUM SERPL-SCNC: 136 MMOL/L — SIGNIFICANT CHANGE UP (ref 135–145)
T PALLIDUM AB TITR SER: NEGATIVE — SIGNIFICANT CHANGE UP
WBC # BLD: 11.85 K/UL — HIGH (ref 3.8–10.5)
WBC # FLD AUTO: 11.85 K/UL — HIGH (ref 3.8–10.5)

## 2024-02-20 PROCEDURE — 76819 FETAL BIOPHYS PROFIL W/O NST: CPT | Mod: 26

## 2024-02-20 PROCEDURE — 76820 UMBILICAL ARTERY ECHO: CPT | Mod: 26

## 2024-02-20 RX ORDER — CALCIUM CARBONATE 500(1250)
1 TABLET ORAL THREE TIMES A DAY
Refills: 0 | Status: DISCONTINUED | OUTPATIENT
Start: 2024-02-20 | End: 2024-02-21

## 2024-02-20 RX ORDER — SENNA PLUS 8.6 MG/1
2 TABLET ORAL AT BEDTIME
Refills: 0 | Status: DISCONTINUED | OUTPATIENT
Start: 2024-02-20 | End: 2024-02-21

## 2024-02-20 RX ORDER — POLYETHYLENE GLYCOL 3350 17 G/17G
17 POWDER, FOR SOLUTION ORAL EVERY 12 HOURS
Refills: 0 | Status: DISCONTINUED | OUTPATIENT
Start: 2024-02-20 | End: 2024-02-21

## 2024-02-20 RX ORDER — HEPARIN SODIUM 5000 [USP'U]/ML
5000 INJECTION INTRAVENOUS; SUBCUTANEOUS EVERY 12 HOURS
Refills: 0 | Status: DISCONTINUED | OUTPATIENT
Start: 2024-02-20 | End: 2024-02-21

## 2024-02-20 RX ADMIN — HEPARIN SODIUM 5000 UNIT(S): 5000 INJECTION INTRAVENOUS; SUBCUTANEOUS at 22:46

## 2024-02-20 RX ADMIN — Medication 1 TABLET(S): at 14:26

## 2024-02-20 RX ADMIN — SENNA PLUS 2 TABLET(S): 8.6 TABLET ORAL at 22:45

## 2024-02-20 RX ADMIN — Medication 12 MILLIGRAM(S): at 14:27

## 2024-02-20 RX ADMIN — CHLORHEXIDINE GLUCONATE 1 APPLICATION(S): 213 SOLUTION TOPICAL at 23:09

## 2024-02-20 RX ADMIN — SODIUM CHLORIDE 75 MILLILITER(S): 9 INJECTION, SOLUTION INTRAVENOUS at 07:33

## 2024-02-20 RX ADMIN — POLYETHYLENE GLYCOL 3350 17 GRAM(S): 17 POWDER, FOR SOLUTION ORAL at 22:45

## 2024-02-20 NOTE — CHART NOTE - NSCHARTNOTEFT_GEN_A_CORE
BPP 8/8 MVP 16.34cm  Will try for NST in 2hrs    Amyeo Afroz Jereen, PGY-3 FHT difficult due to polyhydramnios  BPP 8/8 MVP 16.34cm  Will try for NST in 2hrs    Amyeo Afroz Jereen, PGY-3

## 2024-02-20 NOTE — PROGRESS NOTE ADULT - SUBJECTIVE AND OBJECTIVE BOX
R3 Antepartum Note, HD#2    Patient seen and examined at bedside, on toco monitoring overnight d/t concern for PPROM. placental abruption or PTD. Over night, she felt contractions infrequently, and currently she doesn't feel any of her contractions, . Pt reports +FM, denies LOF, VB, ctx, HA, epigastric pain, blurred vision, CP, SOB, N/V, fevers, and chills.    Vital Signs Last 24 Hours  T(C): 36.9 (02-20-24 @ 07:30), Max: 36.9 (02-19-24 @ 16:30)  HR: 102 (02-20-24 @ 09:00) (60 - 114)  BP: 122/65 (02-20-24 @ 09:00) (90/63 - 126/65)  RR: 16 (02-19-24 @ 16:30) (16 - 17)  SpO2: --    CAPILLARY BLOOD GLUCOSE          Physical Exam:  General: NAD  Abdomen: Soft, non-tender, gravid  Ext: No pain or swelling    NST reactive overnight    Labs:             11.8   11.85 )-----------( 257      ( 02-20 @ 05:30 )             36.7     02-20 @ 05:30    136  |  103  |  6   ----------------------------<  103  4.1   |  16  |  0.70    Ca    9.1      02-20 @ 05:30    TPro  6.7  /  Alb  3.3  /  TBili  0.7  /  DBili  x   /  AST  40  /  ALT  42  /  AlkPhos  111  02-20 @ 05:30    PT/INR - ( 02-19 @ 20:45 )   PT: 10.9 sec;   INR: 0.96 ratio    PTT - ( 02-20 @ 05:30 )  PTT:23.9 sec        MEDICATIONS  (STANDING):  betamethasone Injectable 12 milliGRAM(s) IntraMuscular every 24 hours  chlorhexidine 2% Cloths 1 Application(s) Topical daily  influenza   Vaccine 0.5 milliLiter(s) IntraMuscular once  lactated ringers. 1000 milliLiter(s) (75 mL/Hr) IV Continuous <Continuous>    MEDICATIONS  (PRN):

## 2024-02-20 NOTE — CHART NOTE - NSCHARTNOTEFT_GEN_A_CORE
FHT difficult due to polyhydramnios  BPP 8/8 MVP 17.14cm  Will try for NST in 2hrs    Amyeo Afroz Jereen, PGY-3.

## 2024-02-20 NOTE — PROGRESS NOTE ADULT - ASSESSMENT
32yo  p/w severe polyhydramnios (FELZI 12) s/p amnioreduction of >2L of amniotic fluid, now being admitted for r/o PTD vs AROM vs Chorio, maternal and fetal heart reassuring overnight.    1. Polyhydramnios  -c/w latency antibiotics ( 0 )  -continue to monitor fetal status and for signs of labor or infection    3.  Maternal well-being  - CLD while on floors,  Reg Diet when off the floors.  - SCDs, and ambulation for DVT prophylaxis  - Hold HSQ    4.  Fetal well being   -Cont monitoring  -ATU sono twice weekly  -prenatal vitamin    Amyeo Afroz Jereen, PGY-3

## 2024-02-21 ENCOUNTER — APPOINTMENT (OUTPATIENT)
Dept: ANTEPARTUM | Facility: CLINIC | Age: 34
End: 2024-02-21
Payer: COMMERCIAL

## 2024-02-21 ENCOUNTER — TRANSCRIPTION ENCOUNTER (OUTPATIENT)
Age: 34
End: 2024-02-21

## 2024-02-21 ENCOUNTER — ASOB RESULT (OUTPATIENT)
Age: 34
End: 2024-02-21

## 2024-02-21 VITALS
SYSTOLIC BLOOD PRESSURE: 99 MMHG | TEMPERATURE: 97 F | RESPIRATION RATE: 17 BRPM | HEART RATE: 99 BPM | DIASTOLIC BLOOD PRESSURE: 60 MMHG | OXYGEN SATURATION: 97 %

## 2024-02-21 LAB
ALBUMIN SERPL ELPH-MCNC: 3.1 G/DL — LOW (ref 3.3–5)
ALP SERPL-CCNC: 94 U/L — SIGNIFICANT CHANGE UP (ref 40–120)
ALT FLD-CCNC: 54 U/L — HIGH (ref 4–33)
AMYLASE P1 CFR SERPL: 34 U/L — SIGNIFICANT CHANGE UP (ref 25–125)
ANION GAP SERPL CALC-SCNC: 15 MMOL/L — HIGH (ref 7–14)
APAP SERPL-MCNC: <10 UG/ML — LOW (ref 15–25)
APTT BLD: 23.2 SEC — LOW (ref 24.5–35.6)
AST SERPL-CCNC: 55 U/L — HIGH (ref 4–32)
BASOPHILS # BLD AUTO: 0.02 K/UL — SIGNIFICANT CHANGE UP (ref 0–0.2)
BASOPHILS NFR BLD AUTO: 0.2 % — SIGNIFICANT CHANGE UP (ref 0–2)
BILIRUB SERPL-MCNC: 0.5 MG/DL — SIGNIFICANT CHANGE UP (ref 0.2–1.2)
BUN SERPL-MCNC: 6 MG/DL — LOW (ref 7–23)
CALCIUM SERPL-MCNC: 9 MG/DL — SIGNIFICANT CHANGE UP (ref 8.4–10.5)
CHLORIDE SERPL-SCNC: 105 MMOL/L — SIGNIFICANT CHANGE UP (ref 98–107)
CO2 SERPL-SCNC: 19 MMOL/L — LOW (ref 22–31)
CREAT SERPL-MCNC: 0.69 MG/DL — SIGNIFICANT CHANGE UP (ref 0.5–1.3)
CULTURE RESULTS: SIGNIFICANT CHANGE UP
CULTURE RESULTS: SIGNIFICANT CHANGE UP
EGFR: 117 ML/MIN/1.73M2 — SIGNIFICANT CHANGE UP
EOSINOPHIL # BLD AUTO: 0 K/UL — SIGNIFICANT CHANGE UP (ref 0–0.5)
EOSINOPHIL NFR BLD AUTO: 0 % — SIGNIFICANT CHANGE UP (ref 0–6)
FERRITIN SERPL-MCNC: 20 NG/ML — SIGNIFICANT CHANGE UP (ref 15–150)
FIBRINOGEN PPP-MCNC: 577 MG/DL — HIGH (ref 200–465)
GLUCOSE SERPL-MCNC: 100 MG/DL — HIGH (ref 70–99)
HCT VFR BLD CALC: 35.2 % — SIGNIFICANT CHANGE UP (ref 34.5–45)
HGB BLD-MCNC: 11.3 G/DL — LOW (ref 11.5–15.5)
IANC: 10.3 K/UL — HIGH (ref 1.8–7.4)
IMM GRANULOCYTES NFR BLD AUTO: 1.5 % — HIGH (ref 0–0.9)
INR BLD: 0.95 RATIO — SIGNIFICANT CHANGE UP (ref 0.85–1.18)
IRON SATN MFR SERPL: 28 UG/DL — LOW (ref 30–160)
IRON SATN MFR SERPL: 6 % — LOW (ref 14–50)
LDH SERPL L TO P-CCNC: 185 U/L — SIGNIFICANT CHANGE UP (ref 135–225)
LIDOCAIN IGE QN: 12 U/L — SIGNIFICANT CHANGE UP (ref 7–60)
LYMPHOCYTES # BLD AUTO: 1.11 K/UL — SIGNIFICANT CHANGE UP (ref 1–3.3)
LYMPHOCYTES # BLD AUTO: 9 % — LOW (ref 13–44)
MCHC RBC-ENTMCNC: 28.6 PG — SIGNIFICANT CHANGE UP (ref 27–34)
MCHC RBC-ENTMCNC: 32.1 GM/DL — SIGNIFICANT CHANGE UP (ref 32–36)
MCV RBC AUTO: 89.1 FL — SIGNIFICANT CHANGE UP (ref 80–100)
MONOCYTES # BLD AUTO: 0.66 K/UL — SIGNIFICANT CHANGE UP (ref 0–0.9)
MONOCYTES NFR BLD AUTO: 5.4 % — SIGNIFICANT CHANGE UP (ref 2–14)
NEUTROPHILS # BLD AUTO: 10.3 K/UL — HIGH (ref 1.8–7.4)
NEUTROPHILS NFR BLD AUTO: 83.9 % — HIGH (ref 43–77)
NRBC # BLD: 0 /100 WBCS — SIGNIFICANT CHANGE UP (ref 0–0)
NRBC # FLD: 0 K/UL — SIGNIFICANT CHANGE UP (ref 0–0)
PLATELET # BLD AUTO: 263 K/UL — SIGNIFICANT CHANGE UP (ref 150–400)
POTASSIUM SERPL-MCNC: 4 MMOL/L — SIGNIFICANT CHANGE UP (ref 3.5–5.3)
POTASSIUM SERPL-SCNC: 4 MMOL/L — SIGNIFICANT CHANGE UP (ref 3.5–5.3)
PROT SERPL-MCNC: 6.4 G/DL — SIGNIFICANT CHANGE UP (ref 6–8.3)
PROTHROM AB SERPL-ACNC: 10.7 SEC — SIGNIFICANT CHANGE UP (ref 9.5–13)
RBC # BLD: 3.95 M/UL — SIGNIFICANT CHANGE UP (ref 3.8–5.2)
RBC # FLD: 15.1 % — HIGH (ref 10.3–14.5)
SALICYLATES SERPL-MCNC: <0.3 MG/DL — LOW (ref 15–30)
SODIUM SERPL-SCNC: 139 MMOL/L — SIGNIFICANT CHANGE UP (ref 135–145)
SPECIMEN SOURCE: SIGNIFICANT CHANGE UP
SPECIMEN SOURCE: SIGNIFICANT CHANGE UP
TIBC SERPL-MCNC: 488 UG/DL — HIGH (ref 220–430)
UIBC SERPL-MCNC: 460 UG/DL — HIGH (ref 110–370)
URATE SERPL-MCNC: 8.5 MG/DL — HIGH (ref 2.5–7)
WBC # BLD: 12.28 K/UL — HIGH (ref 3.8–10.5)
WBC # FLD AUTO: 12.28 K/UL — HIGH (ref 3.8–10.5)

## 2024-02-21 PROCEDURE — 76705 ECHO EXAM OF ABDOMEN: CPT | Mod: 26

## 2024-02-21 PROCEDURE — 76819 FETAL BIOPHYS PROFIL W/O NST: CPT | Mod: 26

## 2024-02-21 RX ORDER — TETANUS TOXOID, REDUCED DIPHTHERIA TOXOID AND ACELLULAR PERTUSSIS VACCINE, ADSORBED 5; 2.5; 8; 8; 2.5 [IU]/.5ML; [IU]/.5ML; UG/.5ML; UG/.5ML; UG/.5ML
0.5 SUSPENSION INTRAMUSCULAR ONCE
Refills: 0 | Status: COMPLETED | OUTPATIENT
Start: 2024-02-21 | End: 2024-02-21

## 2024-02-21 RX ORDER — MAGNESIUM HYDROXIDE 400 MG/1
30 TABLET, CHEWABLE ORAL DAILY
Refills: 0 | Status: DISCONTINUED | OUTPATIENT
Start: 2024-02-21 | End: 2024-02-21

## 2024-02-21 RX ORDER — CALCIUM CARBONATE 500(1250)
1 TABLET ORAL
Qty: 0 | Refills: 0 | DISCHARGE
Start: 2024-02-21

## 2024-02-21 RX ADMIN — HEPARIN SODIUM 5000 UNIT(S): 5000 INJECTION INTRAVENOUS; SUBCUTANEOUS at 12:01

## 2024-02-21 RX ADMIN — TETANUS TOXOID, REDUCED DIPHTHERIA TOXOID AND ACELLULAR PERTUSSIS VACCINE, ADSORBED 0.5 MILLILITER(S): 5; 2.5; 8; 8; 2.5 SUSPENSION INTRAMUSCULAR at 15:46

## 2024-02-21 RX ADMIN — POLYETHYLENE GLYCOL 3350 17 GRAM(S): 17 POWDER, FOR SOLUTION ORAL at 12:01

## 2024-02-21 RX ADMIN — MAGNESIUM HYDROXIDE 30 MILLILITER(S): 400 TABLET, CHEWABLE ORAL at 09:15

## 2024-02-21 NOTE — PROGRESS NOTE ADULT - ATTENDING COMMENTS
Patient seen and examined  Has less contractions than before  She has less shortness of breath than before  Fetal heart monitoring difficult due to polyhydramnios  BPP has been normal  Will allow regular diet and intermittent monitoring for FHR tracing   She is at risk of abruption and will closely follow  up
Patient seen and examined  Has no complains, shortness orf breath and feels good fetal movements  BPP was 8/8 but FELIZ was 60 and unable to get good fetal tracing due to the fluid volume  Abdomen is soft and non tender   LFTs were mildly elevated, will send work up for hepatitis and right upper quadrant sono  As the patient has been stable and not symptomatic, will discharge her today and follow up in ATU

## 2024-02-21 NOTE — PROGRESS NOTE ADULT - SUBJECTIVE AND OBJECTIVE BOX
Patient seen and examined at bedside, no acute overnight events. Reports mild cramping overnight. No acute complaints. Pt reports +FM, denies LOF, VB, ctx, HA, epigastric pain, blurred vision, CP, SOB, N/V, fevers, and chills.    Vital Signs Last 24 Hours  T(C): 36.7 (02-21-24 @ 01:27), Max: 36.9 (02-20-24 @ 07:30)  HR: 88 (02-21-24 @ 05:57) (79 - 122)  BP: 105/59 (02-21-24 @ 05:57) (100/68 - 122/65)  RR: 17 (02-21-24 @ 01:27) (17 - 18)  SpO2: 96% (02-21-24 @ 05:57) (87% - 100%)    CAPILLARY BLOOD GLUCOSE          Physical Exam:  General: NAD  Abdomen: Soft, non-tender, gravid  Ext: No pain or swelling    NST reactive overnight    Labs:             11.8   11.85 )-----------( 257      ( 02-20 @ 05:30 )             36.7     02-20 @ 05:30    136  |  103  |  6   ----------------------------<  103  4.1   |  16  |  0.70    Ca    9.1      02-20 @ 05:30    TPro  6.7  /  Alb  3.3  /  TBili  0.7  /  DBili  x   /  AST  40  /  ALT  42  /  AlkPhos  111  02-20 @ 05:30    PT/INR - ( 02-19 @ 20:45 )   PT: 10.9 sec;   INR: 0.96 ratio    PTT - ( 02-20 @ 05:30 )  PTT:23.9 sec        MEDICATIONS  (STANDING):  chlorhexidine 2% Cloths 1 Application(s) Topical daily  heparin   Injectable 5000 Unit(s) SubCutaneous every 12 hours  influenza   Vaccine 0.5 milliLiter(s) IntraMuscular once  polyethylene glycol 3350 17 Gram(s) Oral every 12 hours  senna 2 Tablet(s) Oral at bedtime    MEDICATIONS  (PRN):  calcium carbonate    500 mG (Tums) Chewable 1 Tablet(s) Chew three times a day PRN Indigestion   Patient seen and examined at bedside, no acute overnight events. Reports mild cramping overnight, more consistent with her baseline. Rated a 3/10 and not bothersome. SOB is markedly improved after amnioreduction. No vaginal bleeding. Reports constipation. Passing flatus, small volume stool yesturday, but feels notable pressure. Taking Miralax senna, and prune juice.     Vital Signs Last 24 Hours  T(C): 36.7 (02-21-24 @ 01:27), Max: 36.9 (02-20-24 @ 07:30)  HR: 88 (02-21-24 @ 05:57) (79 - 122)  BP: 105/59 (02-21-24 @ 05:57) (100/68 - 122/65)  RR: 17 (02-21-24 @ 01:27) (17 - 18)  SpO2: 96% (02-21-24 @ 05:57) (87% - 100%)    CAPILLARY BLOOD GLUCOSE    Physical Exam:  General: NAD  Abdomen: Soft, non-tender, gravid  Ext: No pain or swelling    NST reactive overnight    Labs:             11.8   11.85 )-----------( 257      ( 02-20 @ 05:30 )             36.7     02-20 @ 05:30    136  |  103  |  6   ----------------------------<  103  4.1   |  16  |  0.70    Ca    9.1      02-20 @ 05:30    TPro  6.7  /  Alb  3.3  /  TBili  0.7  /  DBili  x   /  AST  40  /  ALT  42  /  AlkPhos  111  02-20 @ 05:30    PT/INR - ( 02-19 @ 20:45 )   PT: 10.9 sec;   INR: 0.96 ratio    PTT - ( 02-20 @ 05:30 )  PTT:23.9 sec        MEDICATIONS  (STANDING):  chlorhexidine 2% Cloths 1 Application(s) Topical daily  heparin   Injectable 5000 Unit(s) SubCutaneous every 12 hours  influenza   Vaccine 0.5 milliLiter(s) IntraMuscular once  polyethylene glycol 3350 17 Gram(s) Oral every 12 hours  senna 2 Tablet(s) Oral at bedtime    MEDICATIONS  (PRN):  calcium carbonate    500 mG (Tums) Chewable 1 Tablet(s) Chew three times a day PRN Indigestion

## 2024-02-21 NOTE — DISCHARGE NOTE ANTEPARTUM - PROVIDER TOKENS
PROVIDER:[TOKEN:[[id number]]] PROVIDER:[TOKEN:[[id number]],ESTABLISHEDPATIENT:[T]],FREE:[LAST:[[facility]],FIRST:[[place]],PHONE:[(   )    -],FAX:[(   )    -],SCHEDULEDAPPT:[02/23/2024],SCHEDULEDAPPTTIME:[10:00 AM],ESTABLISHEDPATIENT:[T]]

## 2024-02-21 NOTE — DISCHARGE NOTE ANTEPARTUM - CARE PROVIDER_API CALL
Luh Leung  Obstetrics and Gynecology  [address] [place] [address]  Phone: [phone]  Fax: [phone]  Follow Up Time:    Luh Leung  Obstetrics and Gynecology  [address] [place] [address]  Phone: [phone]  Fax: [phone]  Established Patient  Follow Up Time:     [facility]  Phone: (   )    -  Fax: (   )    -  Established Patient  Scheduled Appointment: 02/23/2024 10:00 AM

## 2024-02-21 NOTE — DISCHARGE NOTE ANTEPARTUM - PLAN OF CARE
34yo  @32w4d a/w severe polyhydramnios (FELIZ 80) s/p amnioreduction. Observed for s/sx of abruption.    Plan:  -F/up with OB within 1 week

## 2024-02-21 NOTE — DISCHARGE NOTE ANTEPARTUM - CARE PLAN
1 Principal Discharge DX:	Polyhydramnios in third trimester  Assessment and plan of treatment:	34yo  @32w4d a/w severe polyhydramnios (FELIZ 80) s/p amnioreduction. Observed for s/sx of abruption.    Plan:  -F/up with OB within 1 week

## 2024-02-21 NOTE — PROGRESS NOTE ADULT - ASSESSMENT
34yo  p/w severe polyhydramnios (FELIZ 12) s/p amnioreduction of >2L of amniotic fluid, now being admitted for r/o PTD vs AROM vs Chorio, maternal and fetal heart reassuring overnight.    1. Polyhydramnios  - Monitor for signs / symptoms of abruption   - CBC/Coag trend as above. f/u AM labs.   - Continious toco monitoring.     3.  Maternal well-being  - Reg Diet  - SCDs/HSQ , and ambulation for DVT prophylaxis    4.  Fetal well being   -Cont toco monitoring   - NST BID, limited by poly status   - BPP daily   -ATU sono twice weekly  -prenatal vitamin    Melisa Brumfield PGY3  34yo  at 32w4d p/w severe polyhydramnios (FELIZ 12) s/p amnioreduction of >2L of amniotic fluid. Maternal and fetal status reassuring overnight.     1. Polyhydramnios  - Monitor for signs / symptoms of abruption   - CBC/Coag trend as above. f/u AM labs.     3.  Maternal well-being  - Reg Diet  - Miralax, Senna, and prune juice. Offered enema. Pt to consider.   - SCDs/HSQ , and ambulation for DVT prophylaxis    4.  Fetal well being   - NST BID, limited by poly status   - BPP daily   - ATU sono twice weekly  - prenatal vitamin    Melisa Brumfield PGY3

## 2024-02-21 NOTE — DISCHARGE NOTE ANTEPARTUM - HOSPITAL COURSE
34yo  at 32w4d p/w severe polyhydramnios (FELIZ 80) s/p amnioreduction of >2L of amniotic fluid. S/p amnioreduction, patient was observed for s/sx of abruption. Labs have been WNL. NST BID, limited 2'2 polyhydramnios. ATU sono daily. : Vertex, anterior, FELIZ 60.16, MVP 16, BPP 8/8, UAD WNL.     Patient cleared for d/c by MFM.    Follow up with Dr. Gregory within 1 week. 34yo  at 32w4d p/w severe polyhydramnios (FELIZ 80) s/p amnioreduction of >2L of amniotic fluid. S/p amnioreduction, patient was observed for s/sx of abruption. Labs have been WNL. NST BID, limited 2'2 polyhydramnios. ATU sono daily. : Vertex, anterior, FELIZ 60.16, MVP 16, BPP 8/8, UAD WNL.     Patient cleared for d/c by MFM.    Follow up with Dr. Gregory within 1 week.  ATU BPP + NST scheduled for 24 @10am 32yo  at 32w4d p/w severe polyhydramnios (FELIZ 80) s/p amnioreduction of >2L of amniotic fluid. S/p amnioreduction, patient was observed for s/sx of abruption. Labs have been WNL. NST BID, limited 2'2 polyhydramnios. ATU sono daily. : Vertex, anterior, FELIZ 60.16, MVP 16, BPP 8/8, UAD WNL. Patient noted to have mild transaminitis. RUQ performed and showed "Biliary sludge, without sonographic evidence of acute cholecystitis. Mild right hydronephrosis, probably due to gravid uterus. Echogenic right hepatic lesion, measuring 1.0 cm, possibly hemangioma in the absence of history of liver disease or primary malignancy." Benign findings. Patient can f/up outpatient.    Patient cleared for d/c by M.    Follow up with Dr. Gregory within 1 week.  ATU BPP + NST scheduled for 24 @10am

## 2024-02-21 NOTE — DISCHARGE NOTE ANTEPARTUM - MEDICATION SUMMARY - MEDICATIONS TO STOP TAKING
I will STOP taking the medications listed below when I get home from the hospital:    ibuprofen 600 mg oral tablet  -- 1 tab(s) by mouth every 6 hours, As Needed    cephalexin 500 mg oral capsule  -- 1 cap(s) by mouth 3 times a day    terconazole 0.8% vaginal cream  -- 1 applicatorful intravaginally once a day (at bedtime)    Prenatal Multivitamins with Folic Acid 1 mg oral tablet  -- 1 tab(s) by mouth once a day    ceftriaxone

## 2024-02-21 NOTE — DISCHARGE NOTE ANTEPARTUM - MEDICATION SUMMARY - MEDICATIONS TO TAKE
I will START or STAY ON the medications listed below when I get home from the hospital:    calcium carbonate 500 mg (200 mg elemental calcium) oral tablet, chewable  -- 1 tab(s) by mouth 3 times a day As needed Indigestion  -- Indication: For GERD    Prenatal Multivitamins with Folic Acid 1 mg oral tablet  -- 1 tab(s) by mouth once a day  -- Indication: For Other pregnancy-related conditions, antepartum

## 2024-02-21 NOTE — DISCHARGE NOTE ANTEPARTUM - PATIENT PORTAL LINK FT
You can access the FollowMyHealth Patient Portal offered by [facility] by registering at the following website: [email or web address]. By joining [facility]’s FollowMyHealth portal, you will also be able to view your health information using other applications (apps) compatible with our system.

## 2024-02-21 NOTE — DISCHARGE NOTE ANTEPARTUM - NS MD DC FALL RISK RISK
For information on Fall & Injury Prevention, visit: [email or web address] OR  [email or web address] OR  https://www.cdc.gov/steadi/patient.html

## 2024-02-22 ENCOUNTER — APPOINTMENT (OUTPATIENT)
Dept: ANTEPARTUM | Facility: CLINIC | Age: 34
End: 2024-02-22

## 2024-02-22 LAB
CERULOPLASMIN SERPL-MCNC: 36 MG/DL — SIGNIFICANT CHANGE UP (ref 16–45)
HAV IGM SER-ACNC: SIGNIFICANT CHANGE UP
HBV CORE IGM SER-ACNC: SIGNIFICANT CHANGE UP
HBV SURFACE AG SER-ACNC: SIGNIFICANT CHANGE UP
HCV AB S/CO SERPL IA: 0.59 S/CO — SIGNIFICANT CHANGE UP (ref 0–0.99)
HCV AB SERPL-IMP: SIGNIFICANT CHANGE UP
HSV DNA1: SIGNIFICANT CHANGE UP
HSV DNA2: SIGNIFICANT CHANGE UP
HSV1 DNA BLD QL NAA+PROBE: SIGNIFICANT CHANGE UP
HSV2 DNA BLD QL NAA+PROBE: SIGNIFICANT CHANGE UP
IGG FLD-MCNC: 958 MG/DL — SIGNIFICANT CHANGE UP (ref 610–1660)
IGG1 SER-MCNC: 522 MG/DL — SIGNIFICANT CHANGE UP (ref 240–1118)
IGG2 SER-MCNC: 227 MG/DL — SIGNIFICANT CHANGE UP (ref 124–549)
IGG3 SER-MCNC: 48.8 MG/DL — SIGNIFICANT CHANGE UP (ref 15–102)
IGG4 SER-MCNC: 17.4 MG/DL — SIGNIFICANT CHANGE UP (ref 1–123)

## 2024-02-23 ENCOUNTER — APPOINTMENT (OUTPATIENT)
Dept: ANTEPARTUM | Facility: CLINIC | Age: 34
End: 2024-02-23
Payer: COMMERCIAL

## 2024-02-23 ENCOUNTER — ASOB RESULT (OUTPATIENT)
Age: 34
End: 2024-02-23

## 2024-02-23 LAB
ANA TITR SER: NEGATIVE — SIGNIFICANT CHANGE UP
MITOCHONDRIA AB SER-ACNC: SIGNIFICANT CHANGE UP
SMOOTH MUSCLE AB SER-ACNC: SIGNIFICANT CHANGE UP

## 2024-02-23 PROCEDURE — 76816 OB US FOLLOW-UP PER FETUS: CPT

## 2024-02-23 PROCEDURE — 76819 FETAL BIOPHYS PROFIL W/O NST: CPT

## 2024-02-24 LAB
CMV IGM FLD-ACNC: <8 AU/ML — SIGNIFICANT CHANGE UP
CMV IGM SERPL QL: NEGATIVE — SIGNIFICANT CHANGE UP
EBV EA AB SER IA-ACNC: <5 U/ML — SIGNIFICANT CHANGE UP
EBV EA AB TITR SER IF: POSITIVE
EBV EA IGG SER-ACNC: NEGATIVE — SIGNIFICANT CHANGE UP
EBV NA IGG SER IA-ACNC: 181 U/ML — HIGH
EBV PATRN SPEC IB-IMP: SIGNIFICANT CHANGE UP
EBV VCA IGG AVIDITY SER QL IA: POSITIVE
EBV VCA IGM SER IA-ACNC: 210 U/ML — HIGH
EBV VCA IGM SER IA-ACNC: <10 U/ML — SIGNIFICANT CHANGE UP
EBV VCA IGM TITR FLD: NEGATIVE — SIGNIFICANT CHANGE UP
HSV1 IGG SER-ACNC: 0.09 INDEX — SIGNIFICANT CHANGE UP
HSV1 IGG SERPL QL IA: NEGATIVE — SIGNIFICANT CHANGE UP
HSV2 IGG FLD-ACNC: 14.5 INDEX — HIGH
HSV2 IGG SERPL QL IA: POSITIVE

## 2024-02-26 ENCOUNTER — APPOINTMENT (OUTPATIENT)
Dept: ANTEPARTUM | Facility: CLINIC | Age: 34
End: 2024-02-26

## 2024-02-27 ENCOUNTER — ASOB RESULT (OUTPATIENT)
Age: 34
End: 2024-02-27

## 2024-02-27 ENCOUNTER — APPOINTMENT (OUTPATIENT)
Dept: ANTEPARTUM | Facility: CLINIC | Age: 34
End: 2024-02-27
Payer: COMMERCIAL

## 2024-02-27 PROCEDURE — 99214 OFFICE O/P EST MOD 30 MIN: CPT | Mod: 25

## 2024-02-27 PROCEDURE — 76819 FETAL BIOPHYS PROFIL W/O NST: CPT

## 2024-02-29 ENCOUNTER — ASOB RESULT (OUTPATIENT)
Age: 34
End: 2024-02-29

## 2024-02-29 ENCOUNTER — INPATIENT (INPATIENT)
Facility: HOSPITAL | Age: 34
LOS: 0 days | Discharge: HOME | End: 2024-03-01
Attending: OBSTETRICS & GYNECOLOGY | Admitting: OBSTETRICS & GYNECOLOGY
Payer: COMMERCIAL

## 2024-02-29 ENCOUNTER — APPOINTMENT (OUTPATIENT)
Dept: ANTEPARTUM | Facility: CLINIC | Age: 34
End: 2024-02-29

## 2024-02-29 ENCOUNTER — APPOINTMENT (OUTPATIENT)
Dept: ANTEPARTUM | Facility: CLINIC | Age: 34
End: 2024-02-29
Payer: COMMERCIAL

## 2024-02-29 VITALS
TEMPERATURE: 97 F | RESPIRATION RATE: 18 BRPM | WEIGHT: 195.99 LBS | HEART RATE: 80 BPM | HEIGHT: 63 IN | DIASTOLIC BLOOD PRESSURE: 80 MMHG | SYSTOLIC BLOOD PRESSURE: 115 MMHG

## 2024-02-29 DIAGNOSIS — Z98.890 OTHER SPECIFIED POSTPROCEDURAL STATES: Chronic | ICD-10-CM

## 2024-02-29 DIAGNOSIS — O40.9XX0 POLYHYDRAMNIOS, UNSPECIFIED TRIMESTER, NOT APPLICABLE OR UNSPECIFIED: ICD-10-CM

## 2024-02-29 LAB
ALBUMIN SERPL ELPH-MCNC: 3 G/DL — LOW (ref 3.3–5)
ALBUMIN SERPL ELPH-MCNC: 3.5 G/DL — SIGNIFICANT CHANGE UP (ref 3.3–5)
ALP SERPL-CCNC: 113 U/L — SIGNIFICANT CHANGE UP (ref 40–120)
ALP SERPL-CCNC: 130 U/L — HIGH (ref 40–120)
ALT FLD-CCNC: 54 U/L — HIGH (ref 4–33)
ALT FLD-CCNC: 69 U/L — HIGH (ref 4–33)
ANION GAP SERPL CALC-SCNC: 14 MMOL/L — SIGNIFICANT CHANGE UP (ref 7–14)
ANION GAP SERPL CALC-SCNC: 15 MMOL/L — HIGH (ref 7–14)
APTT BLD: 25.5 SEC — SIGNIFICANT CHANGE UP (ref 24.5–35.6)
APTT BLD: 26.8 SEC — SIGNIFICANT CHANGE UP (ref 24.5–35.6)
AST SERPL-CCNC: 32 U/L — SIGNIFICANT CHANGE UP (ref 4–32)
AST SERPL-CCNC: 51 U/L — HIGH (ref 4–32)
BASOPHILS # BLD AUTO: 0.04 K/UL — SIGNIFICANT CHANGE UP (ref 0–0.2)
BASOPHILS # BLD AUTO: 0.04 K/UL — SIGNIFICANT CHANGE UP (ref 0–0.2)
BASOPHILS NFR BLD AUTO: 0.4 % — SIGNIFICANT CHANGE UP (ref 0–2)
BASOPHILS NFR BLD AUTO: 0.4 % — SIGNIFICANT CHANGE UP (ref 0–2)
BILIRUB SERPL-MCNC: 0.8 MG/DL — SIGNIFICANT CHANGE UP (ref 0.2–1.2)
BILIRUB SERPL-MCNC: 0.8 MG/DL — SIGNIFICANT CHANGE UP (ref 0.2–1.2)
BLD GP AB SCN SERPL QL: NEGATIVE — SIGNIFICANT CHANGE UP
BUN SERPL-MCNC: 4 MG/DL — LOW (ref 7–23)
BUN SERPL-MCNC: 5 MG/DL — LOW (ref 7–23)
CALCIUM SERPL-MCNC: 8.7 MG/DL — SIGNIFICANT CHANGE UP (ref 8.4–10.5)
CALCIUM SERPL-MCNC: 9.2 MG/DL — SIGNIFICANT CHANGE UP (ref 8.4–10.5)
CHLORIDE SERPL-SCNC: 100 MMOL/L — SIGNIFICANT CHANGE UP (ref 98–107)
CHLORIDE SERPL-SCNC: 103 MMOL/L — SIGNIFICANT CHANGE UP (ref 98–107)
CO2 SERPL-SCNC: 21 MMOL/L — LOW (ref 22–31)
CO2 SERPL-SCNC: 21 MMOL/L — LOW (ref 22–31)
CREAT SERPL-MCNC: 0.92 MG/DL — SIGNIFICANT CHANGE UP (ref 0.5–1.3)
CREAT SERPL-MCNC: 0.95 MG/DL — SIGNIFICANT CHANGE UP (ref 0.5–1.3)
EGFR: 81 ML/MIN/1.73M2 — SIGNIFICANT CHANGE UP
EGFR: 84 ML/MIN/1.73M2 — SIGNIFICANT CHANGE UP
EOSINOPHIL # BLD AUTO: 0.01 K/UL — SIGNIFICANT CHANGE UP (ref 0–0.5)
EOSINOPHIL # BLD AUTO: 0.02 K/UL — SIGNIFICANT CHANGE UP (ref 0–0.5)
EOSINOPHIL NFR BLD AUTO: 0.1 % — SIGNIFICANT CHANGE UP (ref 0–6)
EOSINOPHIL NFR BLD AUTO: 0.2 % — SIGNIFICANT CHANGE UP (ref 0–6)
FIBRINOGEN PPP-MCNC: 679 MG/DL — HIGH (ref 200–465)
FIBRINOGEN PPP-MCNC: 820 MG/DL — HIGH (ref 200–465)
GLUCOSE SERPL-MCNC: 70 MG/DL — SIGNIFICANT CHANGE UP (ref 70–99)
GLUCOSE SERPL-MCNC: 72 MG/DL — SIGNIFICANT CHANGE UP (ref 70–99)
HCT VFR BLD CALC: 35.9 % — SIGNIFICANT CHANGE UP (ref 34.5–45)
HCT VFR BLD CALC: 37.6 % — SIGNIFICANT CHANGE UP (ref 34.5–45)
HGB BLD-MCNC: 11.6 G/DL — SIGNIFICANT CHANGE UP (ref 11.5–15.5)
HGB BLD-MCNC: 12.5 G/DL — SIGNIFICANT CHANGE UP (ref 11.5–15.5)
IANC: 7.83 K/UL — HIGH (ref 1.8–7.4)
IANC: 9.24 K/UL — HIGH (ref 1.8–7.4)
IMM GRANULOCYTES NFR BLD AUTO: 1.3 % — HIGH (ref 0–0.9)
IMM GRANULOCYTES NFR BLD AUTO: 1.8 % — HIGH (ref 0–0.9)
INR BLD: 0.92 RATIO — SIGNIFICANT CHANGE UP (ref 0.85–1.18)
INR BLD: 0.94 RATIO — SIGNIFICANT CHANGE UP (ref 0.85–1.18)
LYMPHOCYTES # BLD AUTO: 1.31 K/UL — SIGNIFICANT CHANGE UP (ref 1–3.3)
LYMPHOCYTES # BLD AUTO: 1.49 K/UL — SIGNIFICANT CHANGE UP (ref 1–3.3)
LYMPHOCYTES # BLD AUTO: 11.5 % — LOW (ref 13–44)
LYMPHOCYTES # BLD AUTO: 14.8 % — SIGNIFICANT CHANGE UP (ref 13–44)
MCHC RBC-ENTMCNC: 28.2 PG — SIGNIFICANT CHANGE UP (ref 27–34)
MCHC RBC-ENTMCNC: 28.7 PG — SIGNIFICANT CHANGE UP (ref 27–34)
MCHC RBC-ENTMCNC: 32.3 GM/DL — SIGNIFICANT CHANGE UP (ref 32–36)
MCHC RBC-ENTMCNC: 33.2 GM/DL — SIGNIFICANT CHANGE UP (ref 32–36)
MCV RBC AUTO: 86.2 FL — SIGNIFICANT CHANGE UP (ref 80–100)
MCV RBC AUTO: 87.3 FL — SIGNIFICANT CHANGE UP (ref 80–100)
MONOCYTES # BLD AUTO: 0.53 K/UL — SIGNIFICANT CHANGE UP (ref 0–0.9)
MONOCYTES # BLD AUTO: 0.63 K/UL — SIGNIFICANT CHANGE UP (ref 0–0.9)
MONOCYTES NFR BLD AUTO: 5.3 % — SIGNIFICANT CHANGE UP (ref 2–14)
MONOCYTES NFR BLD AUTO: 5.5 % — SIGNIFICANT CHANGE UP (ref 2–14)
NEUTROPHILS # BLD AUTO: 7.83 K/UL — HIGH (ref 1.8–7.4)
NEUTROPHILS # BLD AUTO: 9.24 K/UL — HIGH (ref 1.8–7.4)
NEUTROPHILS NFR BLD AUTO: 77.5 % — HIGH (ref 43–77)
NEUTROPHILS NFR BLD AUTO: 81.2 % — HIGH (ref 43–77)
NRBC # BLD: 0 /100 WBCS — SIGNIFICANT CHANGE UP (ref 0–0)
NRBC # BLD: 0 /100 WBCS — SIGNIFICANT CHANGE UP (ref 0–0)
NRBC # FLD: 0 K/UL — SIGNIFICANT CHANGE UP (ref 0–0)
NRBC # FLD: 0 K/UL — SIGNIFICANT CHANGE UP (ref 0–0)
PLATELET # BLD AUTO: 243 K/UL — SIGNIFICANT CHANGE UP (ref 150–400)
PLATELET # BLD AUTO: 251 K/UL — SIGNIFICANT CHANGE UP (ref 150–400)
POTASSIUM SERPL-MCNC: 3.3 MMOL/L — LOW (ref 3.5–5.3)
POTASSIUM SERPL-MCNC: 3.9 MMOL/L — SIGNIFICANT CHANGE UP (ref 3.5–5.3)
POTASSIUM SERPL-SCNC: 3.3 MMOL/L — LOW (ref 3.5–5.3)
POTASSIUM SERPL-SCNC: 3.9 MMOL/L — SIGNIFICANT CHANGE UP (ref 3.5–5.3)
PROT SERPL-MCNC: 6.4 G/DL — SIGNIFICANT CHANGE UP (ref 6–8.3)
PROT SERPL-MCNC: 7.6 G/DL — SIGNIFICANT CHANGE UP (ref 6–8.3)
PROTHROM AB SERPL-ACNC: 10.4 SEC — SIGNIFICANT CHANGE UP (ref 9.5–13)
PROTHROM AB SERPL-ACNC: 10.6 SEC — SIGNIFICANT CHANGE UP (ref 9.5–13)
RBC # BLD: 4.11 M/UL — SIGNIFICANT CHANGE UP (ref 3.8–5.2)
RBC # BLD: 4.36 M/UL — SIGNIFICANT CHANGE UP (ref 3.8–5.2)
RBC # FLD: 14.6 % — HIGH (ref 10.3–14.5)
RBC # FLD: 14.7 % — HIGH (ref 10.3–14.5)
RH IG SCN BLD-IMP: POSITIVE — SIGNIFICANT CHANGE UP
SODIUM SERPL-SCNC: 136 MMOL/L — SIGNIFICANT CHANGE UP (ref 135–145)
SODIUM SERPL-SCNC: 138 MMOL/L — SIGNIFICANT CHANGE UP (ref 135–145)
WBC # BLD: 10.09 K/UL — SIGNIFICANT CHANGE UP (ref 3.8–10.5)
WBC # BLD: 11.38 K/UL — HIGH (ref 3.8–10.5)
WBC # FLD AUTO: 10.09 K/UL — SIGNIFICANT CHANGE UP (ref 3.8–10.5)
WBC # FLD AUTO: 11.38 K/UL — HIGH (ref 3.8–10.5)

## 2024-02-29 PROCEDURE — 59000 AMNIOCENTESIS DIAGNOSTIC: CPT

## 2024-02-29 PROCEDURE — 76819 FETAL BIOPHYS PROFIL W/O NST: CPT | Mod: 26

## 2024-02-29 PROCEDURE — 76946 ECHO GUIDE FOR AMNIOCENTESIS: CPT | Mod: 26

## 2024-02-29 RX ORDER — SODIUM CHLORIDE 9 MG/ML
1000 INJECTION, SOLUTION INTRAVENOUS
Refills: 0 | Status: DISCONTINUED | OUTPATIENT
Start: 2024-02-29 | End: 2024-02-29

## 2024-02-29 RX ORDER — FERROUS SULFATE 325(65) MG
325 TABLET ORAL DAILY
Refills: 0 | Status: DISCONTINUED | OUTPATIENT
Start: 2024-02-29 | End: 2024-03-01

## 2024-02-29 RX ORDER — CHLORHEXIDINE GLUCONATE 213 G/1000ML
1 SOLUTION TOPICAL DAILY
Refills: 0 | Status: DISCONTINUED | OUTPATIENT
Start: 2024-02-29 | End: 2024-03-01

## 2024-02-29 RX ADMIN — CHLORHEXIDINE GLUCONATE 1 APPLICATION(S): 213 SOLUTION TOPICAL at 11:00

## 2024-02-29 RX ADMIN — Medication 1 TABLET(S): at 21:17

## 2024-02-29 RX ADMIN — Medication 325 MILLIGRAM(S): at 21:17

## 2024-02-29 NOTE — OB RN PATIENT PROFILE - NSMATERNALFETALCONCERNS_OBGYN_ALL_OB_FT
Fetal Alert  24 - Severe polyhydramnios at 29 weeks. 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 Suggest an inpatient nonurgent echocardiogram on the baby after birth and prior to discharge from the nursery. If the baby is placed in the  intensive care unit for noncardiac reasons, then a nonurgent echocardiogram should be performed in the first 24 to 48 hours unless any other clinical concerns arise.See echo for FULL report. Diya Dubois RN.  s/p amnio REDUCTION 2/15/2024 . Inpatient NICU consult ; declined prenatal consult with Peds Surgery     Fetal Alert  24 - Severe polyhydramnios at 29 weeks. 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 Suggest an inpatient nonurgent echocardiogram on the baby after birth and prior to discharge from the nursery. If the baby is placed in the  intensive care unit for noncardiac reasons, then a nonurgent echocardiogram should be performed in the first 24 to 48 hours unless any other clinical concerns arise.See echo for FULL report. Diya Dubois RN.  s/p amnio REDUCTION 2/15/2024 . Inpatient NICU consult ; declined prenatal consult with Peds Surgery  24- admission for aminoreduction

## 2024-02-29 NOTE — OB PROVIDER H&P - HISTORY OF PRESENT ILLNESS
34 y/o  @ 33.5 wks gestation presents for scheduled amnioreduction. Pt most recently had reduction . Endorses good fetal movement, denies loss of fluid, endorses contractions, denies vaginal bleeding.      Fetal Alert  24 - Severe polyhydramnios at 29 weeks. 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 Suggest an inpatient nonurgent echocardiogram on the baby after birth and prior to discharge from the nursery. If the baby is placed in the  intensive care unit for noncardiac reasons, then a nonurgent echocardiogram should be performed in the first 24 to 48 hours unless any other clinical concerns arise.See echo for FULL report. Diya Dubois RN.       32 y/o  @ 33.5 wks gestation presents for scheduled amnioreduction. Pt most recently had reduction . Endorses good fetal movement, denies loss of fluid, endorses contractions, denies vaginal bleeding.      Fetal Alert  24 - Severe polyhydramnios at 29 weeks. 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 -KIRK Cowart  24- Fetal echo done 24. A large posterior muscular ventricular septal defect with membranous extension and bidirectional shunt is seen. Otherwise normal study.If there are no other clinical concerns at birth, the baby may be roomed in with the mother in the regular nursery and discharged with her. Suggest an inpatient nonurgent echocardiogram on the baby after birth and prior to discharge from the nursery. If the baby is placed in the  intensive care unit for noncardiac reasons, then a nonurgent echocardiogram should be performed in the first 24 to 48 hours unless any other clinical concerns arise. See echo for FULL report. Diya Dubois RN.

## 2024-02-29 NOTE — OB PROVIDER H&P - ATTENDING COMMENTS
MFM Attending Note    Patient presents for repeat amnioreduction secondary to sever polyhydramnios (last FELIZ >60). Patient received betamethasone on prior admission at 32 weeks. Patient reports similar symptoms of discomfort with movement/sleeping. Patient is able to tolerate sitting. Patient reports normal fetal movement.    Fetus has normal anatomy with polyhydramnios since 29 weeks. The patient had fetal echo at that time with only finding VSD.  Patient reports good fetal movement.  Sono today pending fluid pockets.  EFM attempted but difficult secondary to fetal movement.    Patient again reviewed amnioreduction risks/benefits including  labor, premature rupture of membranes, placental abruption.  We also discussed that will have baseline labs, additional IV, and blood available in case of delivery secondary to high risk of hemorrhage.    Patient did report some left leg swelling and numbness, will complete venous duplex studies will in hospital to evaluate for VTE.  Anesthesia aware patient in hospital and procedure planned.  NICU and  also to be notified prior to procedure.  Dr. Gregory also aware patient in hospital but is not currently in hospital and aware safety officers will be notified. MFM Attending Note    Patient presents for repeat amnioreduction secondary to sever polyhydramnios (last FELIZ >60). Patient received betamethasone on prior admission at 32 weeks. Patient reports similar symptoms of discomfort with movement/sleeping. Patient is able to tolerate sitting. Patient reports normal fetal movement.    Fetus has normal anatomy with polyhydramnios since 29 weeks. The patient had fetal echo at that time with only finding VSD.  Patient reports good fetal movement.  Sono today pending fluid pockets.  EFM attempted but difficult secondary to fetal movement.    Patient again reviewed amnioreduction risks/benefits including  labor, premature rupture of membranes, placental abruption.  We also discussed that will have baseline labs, additional IV, and blood available in case of delivery secondary to high risk of hemorrhage.    Patient did report some left leg swelling and numbness, will complete venous duplex studies will in hospital to evaluate for VTE.  Anesthesia aware patient in hospital and procedure planned.  NICU and  also to be notified prior to procedure.  Dr. Gregory also aware patient in hospital but is not currently in hospital and aware safety officers will be notified.    ---------  Patients labs resulted  Noted to have LFTs with mild transaminitis. Similar to prior admission. Cr 0.92 which is slightly increased compared to prior admission.  Will trend since no clear signs at this time of preeclampsia since normotensive.

## 2024-02-29 NOTE — CHART NOTE - NSCHARTNOTEFT_GEN_A_CORE
SVE Note    Cervical exam performed. Closed/long/high.    Silvia Ochoa MD  M Fellow  Attg: Dr. Castillo SVE Note    Cervical exam performed. Closed/long/high.    Silvia Ochoa MD  M Fellow  Attg: Dr. Castillo      -----------  Notified by fellow of above exam prior to start of procedure.  Ligia Castillo MD  MFM Attending

## 2024-02-29 NOTE — OB RN PATIENT PROFILE - FALL HARM RISK - UNIVERSAL INTERVENTIONS
Bed in lowest position, wheels locked, appropriate side rails in place/Call bell, personal items and telephone in reach/Instruct patient to call for assistance before getting out of bed or chair/Non-slip footwear when patient is out of bed/[place] to call system/Physically safe environment - no spills, clutter or unnecessary equipment/Purposeful Proactive Rounding/Room/bathroom lighting operational, light cord in reach

## 2024-02-29 NOTE — OB PROVIDER H&P - ASSESSMENT
32 y/o  @ 33.5 wks gestation presents for scheduled amnioreduction.  32 y/o  @ 33.5 wks gestation presents for scheduled amnioreduction.     - NPO  - IV insert, LR@125  - CBC, CMP, T+S, coags     Radha Mcfarland, PGY3

## 2024-02-29 NOTE — OB RN PATIENT PROFILE - AS SC BRADEN NUTRITION
Does not appear to be in acute exacerbation    Plan:  Albuterol prn  Monitor resp status (4) excellent

## 2024-02-29 NOTE — CHART NOTE - NSCHARTNOTEFT_GEN_A_CORE
Please see full detailed of procedure in [facility] report.    Patient was counseled about amnioreduction including risk/benefits.  Prior to procedure Dr. Gregory,  in hospital, anesthesia, NICU, and nursing aware patient having procedure.  Patient was position in lateral lie secondary to patient preference of comfort.    Prior to start of procedure time out was called and confirmed patient and procedure.  Patient abdomen was prepped and draped in sterile fashion.    Under ultrasound guidance 20 gauge needle was introduced into the amniotic fluid by Dr. Ochoa (fellow).  The tubing and vacuum sealed container were connected to needle by NATACHA López NP.  Over 1 hour of time 3800 mL of clear amniotic fluid were removed.   Patient did have some contractions during procedure but tolerated well and fetal heart rate was intermittently checked. The fetal heart rate remained 130-140s.    Patient was repositioned to supine at end of the procedure.  Patient will get up to rest room then have NST with continuous toco for at least 1 hour. Would like additional EFM as tolerated/able.  Patient to have CBC, Coags, and CMP at 1700 and continue vital monitoring.  Patient to have lower extremity doppler secondary to left leg numbness/pain.  Patient agrees to stay in the hospital for additional monitoring overnight and will re-assess her care tomorrow.      I was present and gloved throughout the entire procedure with Dr. Ochoa and Rene ANGEL.  Dr. Gregory aware procedure completed.

## 2024-03-01 ENCOUNTER — TRANSCRIPTION ENCOUNTER (OUTPATIENT)
Age: 34
End: 2024-03-01

## 2024-03-01 ENCOUNTER — NON-APPOINTMENT (OUTPATIENT)
Age: 34
End: 2024-03-01

## 2024-03-01 ENCOUNTER — APPOINTMENT (OUTPATIENT)
Dept: ANTEPARTUM | Facility: CLINIC | Age: 34
End: 2024-03-01
Payer: COMMERCIAL

## 2024-03-01 ENCOUNTER — APPOINTMENT (OUTPATIENT)
Dept: ANTEPARTUM | Facility: CLINIC | Age: 34
End: 2024-03-01

## 2024-03-01 ENCOUNTER — RESULT REVIEW (OUTPATIENT)
Age: 34
End: 2024-03-01

## 2024-03-01 ENCOUNTER — ASOB RESULT (OUTPATIENT)
Age: 34
End: 2024-03-01

## 2024-03-01 VITALS
HEART RATE: 91 BPM | SYSTOLIC BLOOD PRESSURE: 105 MMHG | OXYGEN SATURATION: 98 % | TEMPERATURE: 98 F | DIASTOLIC BLOOD PRESSURE: 69 MMHG | RESPIRATION RATE: 17 BRPM

## 2024-03-01 LAB
ALBUMIN SERPL ELPH-MCNC: 3.1 G/DL — LOW (ref 3.3–5)
ALP SERPL-CCNC: 113 U/L — SIGNIFICANT CHANGE UP (ref 40–120)
ALT FLD-CCNC: 52 U/L — HIGH (ref 4–33)
ANION GAP SERPL CALC-SCNC: 17 MMOL/L — HIGH (ref 7–14)
ANNOTATION COMMENT IMP: SIGNIFICANT CHANGE UP
APTT BLD: 26.3 SEC — SIGNIFICANT CHANGE UP (ref 24.5–35.6)
AST SERPL-CCNC: 32 U/L — SIGNIFICANT CHANGE UP (ref 4–32)
BASOPHILS # BLD AUTO: 0.03 K/UL — SIGNIFICANT CHANGE UP (ref 0–0.2)
BASOPHILS NFR BLD AUTO: 0.3 % — SIGNIFICANT CHANGE UP (ref 0–2)
BILIRUB SERPL-MCNC: 1 MG/DL — SIGNIFICANT CHANGE UP (ref 0.2–1.2)
BUN SERPL-MCNC: 5 MG/DL — LOW (ref 7–23)
CALCIUM SERPL-MCNC: 8.8 MG/DL — SIGNIFICANT CHANGE UP (ref 8.4–10.5)
CHLORIDE SERPL-SCNC: 102 MMOL/L — SIGNIFICANT CHANGE UP (ref 98–107)
CO2 SERPL-SCNC: 18 MMOL/L — LOW (ref 22–31)
CREAT SERPL-MCNC: 1.01 MG/DL — SIGNIFICANT CHANGE UP (ref 0.5–1.3)
EGFR: 75 ML/MIN/1.73M2 — SIGNIFICANT CHANGE UP
ELECTRONIC SIGNATURE: SIGNIFICANT CHANGE UP
EOSINOPHIL # BLD AUTO: 0.01 K/UL — SIGNIFICANT CHANGE UP (ref 0–0.5)
EOSINOPHIL NFR BLD AUTO: 0.1 % — SIGNIFICANT CHANGE UP (ref 0–6)
FIBRINOGEN PPP-MCNC: 549 MG/DL — HIGH (ref 200–465)
GLUCOSE SERPL-MCNC: 72 MG/DL — SIGNIFICANT CHANGE UP (ref 70–99)
HCT VFR BLD CALC: 33.9 % — LOW (ref 34.5–45)
HGB BLD-MCNC: 11.1 G/DL — LOW (ref 11.5–15.5)
IANC: 7.78 K/UL — HIGH (ref 1.8–7.4)
IMM GRANULOCYTES NFR BLD AUTO: 1.6 % — HIGH (ref 0–0.9)
INR BLD: 0.94 RATIO — SIGNIFICANT CHANGE UP (ref 0.85–1.18)
LYMPHOCYTES # BLD AUTO: 1.73 K/UL — SIGNIFICANT CHANGE UP (ref 1–3.3)
LYMPHOCYTES # BLD AUTO: 16.8 % — SIGNIFICANT CHANGE UP (ref 13–44)
MCHC RBC-ENTMCNC: 28.7 PG — SIGNIFICANT CHANGE UP (ref 27–34)
MCHC RBC-ENTMCNC: 32.7 GM/DL — SIGNIFICANT CHANGE UP (ref 32–36)
MCV RBC AUTO: 87.6 FL — SIGNIFICANT CHANGE UP (ref 80–100)
MONOCYTES # BLD AUTO: 0.61 K/UL — SIGNIFICANT CHANGE UP (ref 0–0.9)
MONOCYTES NFR BLD AUTO: 5.9 % — SIGNIFICANT CHANGE UP (ref 2–14)
NEUTROPHILS # BLD AUTO: 7.78 K/UL — HIGH (ref 1.8–7.4)
NEUTROPHILS NFR BLD AUTO: 75.3 % — SIGNIFICANT CHANGE UP (ref 43–77)
NRBC # BLD: 0 /100 WBCS — SIGNIFICANT CHANGE UP (ref 0–0)
NRBC # FLD: 0 K/UL — SIGNIFICANT CHANGE UP (ref 0–0)
PLATELET # BLD AUTO: 247 K/UL — SIGNIFICANT CHANGE UP (ref 150–400)
POTASSIUM SERPL-MCNC: 3.3 MMOL/L — LOW (ref 3.5–5.3)
POTASSIUM SERPL-SCNC: 3.3 MMOL/L — LOW (ref 3.5–5.3)
PROT SERPL-MCNC: 6.1 G/DL — SIGNIFICANT CHANGE UP (ref 6–8.3)
PROTHROM AB SERPL-ACNC: 10.5 SEC — SIGNIFICANT CHANGE UP (ref 9.5–13)
RBC # BLD: 3.87 M/UL — SIGNIFICANT CHANGE UP (ref 3.8–5.2)
RBC # FLD: 14.8 % — HIGH (ref 10.3–14.5)
SERPINA1 GENE MUT TESTED BLD/T: SIGNIFICANT CHANGE UP
SODIUM SERPL-SCNC: 137 MMOL/L — SIGNIFICANT CHANGE UP (ref 135–145)
WBC # BLD: 10.32 K/UL — SIGNIFICANT CHANGE UP (ref 3.8–10.5)
WBC # FLD AUTO: 10.32 K/UL — SIGNIFICANT CHANGE UP (ref 3.8–10.5)

## 2024-03-01 PROCEDURE — 76819 FETAL BIOPHYS PROFIL W/O NST: CPT | Mod: 26

## 2024-03-01 PROCEDURE — 76946 ECHO GUIDE FOR AMNIOCENTESIS: CPT | Mod: 26

## 2024-03-01 PROCEDURE — 59000 AMNIOCENTESIS DIAGNOSTIC: CPT

## 2024-03-01 PROCEDURE — 93970 EXTREMITY STUDY: CPT | Mod: 26

## 2024-03-01 NOTE — PROGRESS NOTE ADULT - ASSESSMENT
34 yo  @33w6d admitted for scheduled amnioreduction in the setting of severe polyhydramnios. 3.8L removed on . Fetal and maternal status reassuring overnight.     #Polyhydramnios  - s/p amnioreduction ()    #Fetal wellbeing  - NST BID     #Maternal wellbeing  - Regular diet  - SCDs for DVT ppx  - PNV    Radha Mcfarland, PGY3

## 2024-03-01 NOTE — DISCHARGE NOTE ANTEPARTUM - CARE PROVIDER_API CALL
Luh Leung  Obstetrics and Gynecology  [address] [place] [address]  Phone: [phone]  Fax: [phone]  Follow Up Time:

## 2024-03-01 NOTE — DISCHARGE NOTE ANTEPARTUM - PATIENT PORTAL LINK FT
You can access the FollowMyHealth Patient Portal offered by [facility] by registering at the following website: [email or web address]. By joining [employer] [facility]’s FollowMyHealth portal, you will also be able to view your health information using other applications (apps) compatible with our system.

## 2024-03-01 NOTE — DISCHARGE NOTE ANTEPARTUM - NS MD DC FALL RISK RISK
For information on Fall & Injury Prevention, visit: [email or web address] OR  [email or web address] OR  https://www.cdc.gov/steadi/patient.html

## 2024-03-01 NOTE — DISCHARGE NOTE ANTEPARTUM - MEDICATION SUMMARY - MEDICATIONS TO TAKE
I will START or STAY ON the medications listed below when I get home from the hospital:    calcium carbonate 500 mg (200 mg elemental calcium) oral tablet, chewable  -- 1 tab(s) by mouth 3 times a day As needed Indigestion  -- Indication: For home med    Prenatal Multivitamins with Folic Acid 1 mg oral tablet  -- 1 tab(s) by mouth once a day  -- Indication: For home med

## 2024-03-01 NOTE — DISCHARGE NOTE ANTEPARTUM - CARE PLAN
Principal Discharge DX:	Polyhydramnios in third trimester  Assessment and plan of treatment:	s/p scheduled amnioreduction   1

## 2024-03-01 NOTE — PROGRESS NOTE ADULT - SUBJECTIVE AND OBJECTIVE BOX
Patient seen and examined at bedside, no acute overnight events. No acute complaints. Patient endorses good fetal movement. Patient is ambulating and tolerating regular diet. Denies CP, SOB, N/V, fevers, chills, or any other concerns.    Vital Signs Last 24 Hours  T(C): 36.6 (03-01-24 @ 05:52), Max: 37.0 (02-29-24 @ 19:10)  HR: 97 (03-01-24 @ 05:52) (77 - 115)  BP: 113/71 (03-01-24 @ 05:52) (96/67 - 115/80)  RR: 18 (03-01-24 @ 05:52) (12 - 19)  SpO2: 97% (03-01-24 @ 05:52) (97% - 99%)    I&O's Summary    29 Feb 2024 07:01  -  01 Mar 2024 06:46  --------------------------------------------------------  IN: 625 mL / OUT: 450 mL / NET: 175 mL        Physical Exam:  General: NAD  CV: RR  Lungs: breathing comfortably on RA  Abdomen: soft, gravid, non-tender  Ext: no pain or swelling    Labs:             11.1<L>  10.32 )-----------( 247      ( 03-01 @ 05:50 )             33.9<L>               11.6   11.38<H> )-----------( 243      ( 02-29 @ 17:00 )             35.9                12.5   10.09 )-----------( 251      ( 02-29 @ 12:10 )             37.6         MEDICATIONS  (STANDING):  chlorhexidine 2% Cloths 1 Application(s) Topical daily  ferrous    sulfate 325 milliGRAM(s) Oral daily  prenatal multivitamin 1 Tablet(s) Oral daily    MEDICATIONS  (PRN):

## 2024-03-05 ENCOUNTER — ASOB RESULT (OUTPATIENT)
Age: 34
End: 2024-03-05

## 2024-03-05 ENCOUNTER — APPOINTMENT (OUTPATIENT)
Dept: ANTEPARTUM | Facility: CLINIC | Age: 34
End: 2024-03-05
Payer: COMMERCIAL

## 2024-03-05 PROCEDURE — 99215 OFFICE O/P EST HI 40 MIN: CPT | Mod: 25

## 2024-03-05 PROCEDURE — 76819 FETAL BIOPHYS PROFIL W/O NST: CPT

## 2024-03-07 ENCOUNTER — APPOINTMENT (OUTPATIENT)
Dept: ANTEPARTUM | Facility: CLINIC | Age: 34
End: 2024-03-07

## 2024-03-08 ENCOUNTER — APPOINTMENT (OUTPATIENT)
Dept: ANTEPARTUM | Facility: CLINIC | Age: 34
End: 2024-03-08
Payer: COMMERCIAL

## 2024-03-08 ENCOUNTER — NON-APPOINTMENT (OUTPATIENT)
Age: 34
End: 2024-03-08

## 2024-03-08 ENCOUNTER — ASOB RESULT (OUTPATIENT)
Age: 34
End: 2024-03-08

## 2024-03-08 PROCEDURE — 99213 OFFICE O/P EST LOW 20 MIN: CPT | Mod: 25

## 2024-03-08 PROCEDURE — 76819 FETAL BIOPHYS PROFIL W/O NST: CPT

## 2024-03-10 ENCOUNTER — APPOINTMENT (OUTPATIENT)
Dept: ANTEPARTUM | Facility: CLINIC | Age: 34
End: 2024-03-10

## 2024-03-10 ENCOUNTER — OUTPATIENT (OUTPATIENT)
Dept: INPATIENT UNIT | Facility: HOSPITAL | Age: 34
LOS: 1 days | Discharge: HOME | End: 2024-03-10
Payer: COMMERCIAL

## 2024-03-10 VITALS
RESPIRATION RATE: 16 BRPM | SYSTOLIC BLOOD PRESSURE: 125 MMHG | DIASTOLIC BLOOD PRESSURE: 76 MMHG | HEART RATE: 87 BPM | TEMPERATURE: 98 F

## 2024-03-10 VITALS — HEART RATE: 91 BPM | DIASTOLIC BLOOD PRESSURE: 73 MMHG | SYSTOLIC BLOOD PRESSURE: 125 MMHG

## 2024-03-10 DIAGNOSIS — Z98.890 OTHER SPECIFIED POSTPROCEDURAL STATES: Chronic | ICD-10-CM

## 2024-03-10 DIAGNOSIS — O26.899 OTHER SPECIFIED PREGNANCY RELATED CONDITIONS, UNSPECIFIED TRIMESTER: ICD-10-CM

## 2024-03-10 LAB
ALBUMIN SERPL ELPH-MCNC: 3 G/DL — LOW (ref 3.3–5)
ALP SERPL-CCNC: 123 U/L — HIGH (ref 40–120)
ALT FLD-CCNC: 14 U/L — SIGNIFICANT CHANGE UP (ref 4–33)
ANION GAP SERPL CALC-SCNC: 14 MMOL/L — SIGNIFICANT CHANGE UP (ref 7–14)
APPEARANCE UR: ABNORMAL
APTT BLD: 25.7 SEC — SIGNIFICANT CHANGE UP (ref 24.5–35.6)
AST SERPL-CCNC: 21 U/L — SIGNIFICANT CHANGE UP (ref 4–32)
BACTERIA # UR AUTO: ABNORMAL /HPF
BASOPHILS # BLD AUTO: 0.03 K/UL — SIGNIFICANT CHANGE UP (ref 0–0.2)
BASOPHILS NFR BLD AUTO: 0.4 % — SIGNIFICANT CHANGE UP (ref 0–2)
BILIRUB SERPL-MCNC: 0.4 MG/DL — SIGNIFICANT CHANGE UP (ref 0.2–1.2)
BILIRUB UR-MCNC: NEGATIVE — SIGNIFICANT CHANGE UP
BUN SERPL-MCNC: 3 MG/DL — LOW (ref 7–23)
CALCIUM SERPL-MCNC: 8.3 MG/DL — LOW (ref 8.4–10.5)
CAST: 0 /LPF — SIGNIFICANT CHANGE UP (ref 0–4)
CHLORIDE SERPL-SCNC: 107 MMOL/L — SIGNIFICANT CHANGE UP (ref 98–107)
CO2 SERPL-SCNC: 20 MMOL/L — LOW (ref 22–31)
COLOR SPEC: YELLOW — SIGNIFICANT CHANGE UP
CREAT ?TM UR-MCNC: 28 MG/DL — SIGNIFICANT CHANGE UP
CREAT SERPL-MCNC: 0.75 MG/DL — SIGNIFICANT CHANGE UP (ref 0.5–1.3)
DIFF PNL FLD: NEGATIVE — SIGNIFICANT CHANGE UP
EGFR: 107 ML/MIN/1.73M2 — SIGNIFICANT CHANGE UP
EOSINOPHIL # BLD AUTO: 0.04 K/UL — SIGNIFICANT CHANGE UP (ref 0–0.5)
EOSINOPHIL NFR BLD AUTO: 0.5 % — SIGNIFICANT CHANGE UP (ref 0–6)
FIBRINOGEN PPP-MCNC: 624 MG/DL — HIGH (ref 200–465)
GLUCOSE SERPL-MCNC: 91 MG/DL — SIGNIFICANT CHANGE UP (ref 70–99)
GLUCOSE UR QL: NEGATIVE MG/DL — SIGNIFICANT CHANGE UP
HCT VFR BLD CALC: 33.3 % — LOW (ref 34.5–45)
HGB BLD-MCNC: 10.9 G/DL — LOW (ref 11.5–15.5)
IANC: 5.77 K/UL — SIGNIFICANT CHANGE UP (ref 1.8–7.4)
IMM GRANULOCYTES NFR BLD AUTO: 1.9 % — HIGH (ref 0–0.9)
INR BLD: 0.94 RATIO — SIGNIFICANT CHANGE UP (ref 0.85–1.18)
KETONES UR-MCNC: NEGATIVE MG/DL — SIGNIFICANT CHANGE UP
LDH SERPL L TO P-CCNC: 161 U/L — SIGNIFICANT CHANGE UP (ref 135–225)
LEUKOCYTE ESTERASE UR-ACNC: ABNORMAL
LYMPHOCYTES # BLD AUTO: 1.44 K/UL — SIGNIFICANT CHANGE UP (ref 1–3.3)
LYMPHOCYTES # BLD AUTO: 18.4 % — SIGNIFICANT CHANGE UP (ref 13–44)
MCHC RBC-ENTMCNC: 28.2 PG — SIGNIFICANT CHANGE UP (ref 27–34)
MCHC RBC-ENTMCNC: 32.7 GM/DL — SIGNIFICANT CHANGE UP (ref 32–36)
MCV RBC AUTO: 86.3 FL — SIGNIFICANT CHANGE UP (ref 80–100)
MONOCYTES # BLD AUTO: 0.41 K/UL — SIGNIFICANT CHANGE UP (ref 0–0.9)
MONOCYTES NFR BLD AUTO: 5.2 % — SIGNIFICANT CHANGE UP (ref 2–14)
NEUTROPHILS # BLD AUTO: 5.77 K/UL — SIGNIFICANT CHANGE UP (ref 1.8–7.4)
NEUTROPHILS NFR BLD AUTO: 73.6 % — SIGNIFICANT CHANGE UP (ref 43–77)
NITRITE UR-MCNC: NEGATIVE — SIGNIFICANT CHANGE UP
NRBC # BLD: 0 /100 WBCS — SIGNIFICANT CHANGE UP (ref 0–0)
NRBC # FLD: 0 K/UL — SIGNIFICANT CHANGE UP (ref 0–0)
PH UR: 7 — SIGNIFICANT CHANGE UP (ref 5–8)
PLATELET # BLD AUTO: 228 K/UL — SIGNIFICANT CHANGE UP (ref 150–400)
POTASSIUM SERPL-MCNC: 3.1 MMOL/L — LOW (ref 3.5–5.3)
POTASSIUM SERPL-SCNC: 3.1 MMOL/L — LOW (ref 3.5–5.3)
PROT ?TM UR-MCNC: 5 MG/DL — SIGNIFICANT CHANGE UP
PROT SERPL-MCNC: 6.3 G/DL — SIGNIFICANT CHANGE UP (ref 6–8.3)
PROT UR-MCNC: NEGATIVE MG/DL — SIGNIFICANT CHANGE UP
PROT/CREAT UR-RTO: 0.2 RATIO — SIGNIFICANT CHANGE UP (ref 0–0.2)
PROTHROM AB SERPL-ACNC: 10.6 SEC — SIGNIFICANT CHANGE UP (ref 9.5–13)
RBC # BLD: 3.86 M/UL — SIGNIFICANT CHANGE UP (ref 3.8–5.2)
RBC # FLD: 15.8 % — HIGH (ref 10.3–14.5)
RBC CASTS # UR COMP ASSIST: 0 /HPF — SIGNIFICANT CHANGE UP (ref 0–4)
REVIEW: SIGNIFICANT CHANGE UP
SODIUM SERPL-SCNC: 141 MMOL/L — SIGNIFICANT CHANGE UP (ref 135–145)
SP GR SPEC: 1 — LOW (ref 1–1.03)
SQUAMOUS # UR AUTO: 5 /HPF — SIGNIFICANT CHANGE UP (ref 0–5)
URATE SERPL-MCNC: 8.1 MG/DL — HIGH (ref 2.5–7)
UROBILINOGEN FLD QL: 0.2 MG/DL — SIGNIFICANT CHANGE UP (ref 0.2–1)
WBC # BLD: 7.84 K/UL — SIGNIFICANT CHANGE UP (ref 3.8–10.5)
WBC # FLD AUTO: 7.84 K/UL — SIGNIFICANT CHANGE UP (ref 3.8–10.5)
WBC UR QL: 5 /HPF — SIGNIFICANT CHANGE UP (ref 0–5)
YEAST-LIKE CELLS: PRESENT

## 2024-03-10 PROCEDURE — 99221 1ST HOSP IP/OBS SF/LOW 40: CPT

## 2024-03-10 PROCEDURE — 93970 EXTREMITY STUDY: CPT | Mod: 26

## 2024-03-10 RX ORDER — HEPARIN SODIUM 5000 [USP'U]/ML
5000 INJECTION INTRAVENOUS; SUBCUTANEOUS EVERY 12 HOURS
Refills: 0 | Status: DISCONTINUED | OUTPATIENT
Start: 2024-03-10 | End: 2024-03-25

## 2024-03-10 RX ADMIN — HEPARIN SODIUM 5000 UNIT(S): 5000 INJECTION INTRAVENOUS; SUBCUTANEOUS at 16:25

## 2024-03-10 NOTE — OB RN TRIAGE NOTE - FALL HARM RISK - ATTEMPT OOB
----- Message from Ceferino Cohen MD sent at 3/7/2022 10:13 PM CST -----  Please inform that the ultrasound of the kidney was shown to be normal   No

## 2024-03-10 NOTE — OB PROVIDER TRIAGE NOTE - NSMATERNALFETALCONCERNS_OBGYN_ALL_OB_FT
Fetal Alert  24 - Severe polyhydramnios at 29 weeks. 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 Suggest an inpatient nonurgent echocardiogram on the baby after birth and prior to discharge from the nursery. If the baby is placed in the  intensive care unit for noncardiac reasons, then a nonurgent echocardiogram should be performed in the first 24 to 48 hours unless any other clinical concerns arise.See echo for FULL report. Diya Dubois RN.  s/p amnio REDUCTION x2 secondary to SEVERE POLYHYDRAMNIOS . S/P  inpatient NICU consult ; declined prenatal consult with Peds Surgery. s/p FETAL MDM 3/7/2024

## 2024-03-10 NOTE — OB PROVIDER TRIAGE NOTE - HISTORY OF PRESENT ILLNESS
This is a 33 year old  at 35.1 weeks gestational age presents with complaints of left lower extremity swelling that began on 3/8. Pt states that after amnioreduction, lower extremity swelling improves, however within a few days fluid reaccumulates Pt states swelling is only on left lower extremity, worse with flexion. Denies redness or bruising. Pt was counseled by MFPATSY on 3/8 to begin Heparin 5,000 units bid for venenous stasis/ compression due to severe polyhydramnios and risk for VTE event. However patient states she was unable to  Heparin from pharmacy due to low supply at pharmacy and was told by Dr Gregory's office to come to hospital for a Heparin injection.  Pt reports +GFM, denies, LOF, VB or contraction. denies headache, blurry vision, epigastric pain, nausea, vomitting.    AP course:   - s/p Amnioreduction 2/15,   Fetal Alert  24 - Severe polyhydramnios at 29 weeks. 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 -KIRK Cowart  24- Fetal echo done 24. A large posterior muscular ventricular septal defect with membranous extension and bidirectional shunt is seen. Otherwise normal study.If there are no other clinical concerns at birth, the baby may be roomed in with the mother in the regular nursery and discharged with her. Suggest an inpatient nonurgent echocardiogram on the baby after birth and prior to discharge from the nursery. If the baby is placed in the  intensive care unit for noncardiac reasons, then a nonurgent echocardiogram should be performed in the first 24 to 48 hours unless any other clinical concerns arise. See echo for FULL report. Diya Dubois RN.

## 2024-03-10 NOTE — OB PROVIDER TRIAGE NOTE - NSOBPROVIDERNOTE_OBGYN_ALL_OB_FT
PEC labs  bilateral dopplers PEC labs  bilateral dopplers    Dopplers wnl  PEC labs wnl    Discussed with Dr Gregory  for Heparin 5,000 units today, pt will  Rx or go to Dr Paz office tomorrow for tomorrows heparin shot  continue fetal kick counts, rest and activity as tolerated, increase PO fluid  follow up with Dr Gregory as scheduled  instructed to return on 3/12 for amnioreduction  pt verbalized understanding of instructions given  discharged home at 1640

## 2024-03-10 NOTE — OB RN TRIAGE NOTE - NSMATERNALFETALCONCERNS_OBGYN_ALL_OB_FT
Fetal Alert  24 - Severe polyhydramnios at 29 weeks. 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 Suggest an inpatient nonurgent echocardiogram on the baby after birth and prior to discharge from the nursery. If the baby is placed in the  intensive care unit for noncardiac reasons, then a nonurgent echocardiogram should be performed in the first 24 to 48 hours unless any other clinical concerns arise.See echo for FULL report. Diya Dubois RN.  s/p amnio REDUCTION x2 secondary to SEVERE POLYHYDRAMNIOS . S/P  inpatient NICU consult ; declined prenatal consult with Peds Surgery. s/p FETAL MDM 3/7/2024

## 2024-03-10 NOTE — OB PROVIDER TRIAGE NOTE - NSHPLABSRESULTS_GEN_ALL_CORE
10.9   7.84  )-----------( 228      ( 10 Mar 2024 13:57 )             33.3     0310    141  |  107  |  3<L>  ----------------------------<  91  3.1<L>   |  20<L>  |  0.75    Ca    8.3<L>      10 Mar 2024 13:57    TPro  6.3  /  Alb  3.0<L>  /  TBili  0.4  /  DBili  x   /  AST  21  /  ALT  14  /  AlkPhos  123<H>  03-10    PT/INR - ( 10 Mar 2024 13:57 )   PT: 10.6 sec;   INR: 0.94 ratio         PTT - ( 10 Mar 2024 13:57 )  PTT:25.7 sec    Urinalysis Basic - ( 10 Mar 2024 13:57 )    Color: Yellow / Appearance: Cloudy / S.004 / pH: x  Gluc: 91 mg/dL / Ketone: Negative mg/dL  / Bili: Negative / Urobili: 0.2 mg/dL   Blood: x / Protein: Negative mg/dL / Nitrite: Negative   Leuk Esterase: Large / RBC: 0 /HPF / WBC 5 /HPF   Sq Epi: x / Non Sq Epi: 5 /HPF / Bacteria: Few /HPF    PCR 0.2      ACC: [id number] EXAM:  US DPLX LWR EXT VEINS COMPL BI   ORDERED BY: EDILBERTO AYALA     PROCEDURE DATE:  03/10/2024      INTERPRETATION:  CLINICAL INFORMATION: Lower extremity swelling. Calf   edema. Evaluate for DVT.    COMPARISON: None available.    TECHNIQUE: Duplex sonography of the BILATERAL LOWER extremity veins with   color and spectral Doppler, with and without compression.    FINDINGS:    RIGHT:  Normal compressibility of the RIGHT common femoral, femoral and popliteal   veins.  Doppler examination shows normal spontaneous and phasic flow.  No RIGHT calf vein thrombosis is detected.  Evidence of slow flow within multiple venous structures.    LEFT:  Normal compressibility of the LEFT common femoral, femoral and popliteal   veins.  Doppler examination shows normal spontaneous and phasic flow.  No LEFT calf vein thrombosis is detected. Subcutaneous edema within the   calf.  Evidence of slow flow within multiple venous structures.    IMPRESSION:  No evidence of deep venous thrombosis in either lower extremity.  However, slow venous flow is present bilaterally.    --- End of Report ---    UMESH MCKEON MD; Resident Radiologist  This document has been electronically signed.  MATT AMBRIZ MD; Attending Radiologist  This document has been electronically signed. Mar 10 2024  2:37PM

## 2024-03-10 NOTE — OB PROVIDER TRIAGE NOTE - NSHPPHYSICALEXAM_GEN_ALL_CORE
Vital Signs Last 24 Hrs  T(C): 36.5 (10 Mar 2024 13:11), Max: 36.5 (10 Mar 2024 13:11)  T(F): 97.7 (10 Mar 2024 13:11), Max: 97.7 (10 Mar 2024 13:11)  HR: 102 (10 Mar 2024 13:58) (87 - 102)  BP: 108/67 (10 Mar 2024 13:58) (108/67 - 125/76)  BP(mean): --  RR: 16 (10 Mar 2024 13:11) (16 - 16)  SpO2: --    A&O x3  CTAB  Extremities- left lower extremity with increased swelling from toes to groin, no redn,ess, no bruising, negative Homans sign  right extremity wnl   TAS:  bpm, +GFM, polyhydramnios visualized, bpp 8/8  unable to trace FHR on NST due to severe polyhydramnios Vital Signs Last 24 Hrs  T(C): 36.5 (10 Mar 2024 13:11), Max: 36.5 (10 Mar 2024 13:11)  T(F): 97.7 (10 Mar 2024 13:11), Max: 97.7 (10 Mar 2024 13:11)  HR: 102 (10 Mar 2024 13:58) (87 - 102)  BP: 108/67 (10 Mar 2024 13:58) (108/67 - 125/76)  BP(mean): --  RR: 16 (10 Mar 2024 13:11) (16 - 16)  SpO2: --    A&O x3  CTAB  Extremities- left lower extremity with increased swelling from toes to groin, no redness, no bruising, negative Homans sign  right extremity wnl   TAS:  bpm, anterior placenta, +GFM, polyhydramnios visualized, bpp 8/8  unable to trace FHR on NST due to severe polyhydramnios

## 2024-03-10 NOTE — OB RN TRIAGE NOTE - FALL HARM RISK - UNIVERSAL INTERVENTIONS
Bed in lowest position, wheels locked, appropriate side rails in place/Call bell, personal items and telephone in reach/Instruct patient to call for assistance before getting out of bed or chair/Non-slip footwear when patient is out of bed/[place] to call system/Physically safe environment - no spills, clutter or unnecessary equipment/Purposeful Proactive Rounding/Room/bathroom lighting operational, light cord in reach

## 2024-03-10 NOTE — OB PROVIDER TRIAGE NOTE - NS_SPECEXAM_OBGYN_ALL_OB
No Patient presenting for syncope. patient neuro intact. vital stable. will obtain lab, xray, r/o acs, fracture/dislocation. ed obs and reassess. patient denies head trauma.

## 2024-03-10 NOTE — OB PROVIDER TRIAGE NOTE - ADDITIONAL INSTRUCTIONS
Discussed with Dr Gregory  for Heparin 5,000 units today, pt will  Rx or go to Dr Paz office tomorrow for tomorrows heparin shot  continue fetal kick counts, rest and activity as tolerated, increase PO fluid  follow up with Dr Gregory as scheduled  instructed to return on 3/12 for amnioreduction  pt verbalized understanding of instructions given  discharged home at [id number]

## 2024-03-11 ENCOUNTER — TRANSCRIPTION ENCOUNTER (OUTPATIENT)
Age: 34
End: 2024-03-11

## 2024-03-12 ENCOUNTER — APPOINTMENT (OUTPATIENT)
Dept: ANTEPARTUM | Facility: CLINIC | Age: 34
End: 2024-03-12
Payer: COMMERCIAL

## 2024-03-12 ENCOUNTER — ASOB RESULT (OUTPATIENT)
Age: 34
End: 2024-03-12

## 2024-03-12 ENCOUNTER — INPATIENT (INPATIENT)
Facility: HOSPITAL | Age: 34
LOS: 1 days | Discharge: HOME | End: 2024-03-14
Attending: OBSTETRICS & GYNECOLOGY | Admitting: OBSTETRICS & GYNECOLOGY
Payer: COMMERCIAL

## 2024-03-12 VITALS
HEART RATE: 85 BPM | RESPIRATION RATE: 16 BRPM | DIASTOLIC BLOOD PRESSURE: 61 MMHG | SYSTOLIC BLOOD PRESSURE: 113 MMHG | TEMPERATURE: 98 F

## 2024-03-12 DIAGNOSIS — O99.891 OTHER SPECIFIED DISEASES AND CONDITIONS COMPLICATING PREGNANCY: ICD-10-CM

## 2024-03-12 DIAGNOSIS — O09.13 SUPERVISION OF PREGNANCY WITH HISTORY OF ECTOPIC PREGNANCY, THIRD TRIMESTER: ICD-10-CM

## 2024-03-12 DIAGNOSIS — O09.293 SUPERVISION OF PREGNANCY WITH OTHER POOR REPRODUCTIVE OR OBSTETRIC HISTORY, THIRD TRIMESTER: ICD-10-CM

## 2024-03-12 DIAGNOSIS — O40.3XX0 POLYHYDRAMNIOS, THIRD TRIMESTER, NOT APPLICABLE OR UNSPECIFIED: ICD-10-CM

## 2024-03-12 DIAGNOSIS — M79.89 OTHER SPECIFIED SOFT TISSUE DISORDERS: ICD-10-CM

## 2024-03-12 DIAGNOSIS — Z98.890 OTHER SPECIFIED POSTPROCEDURAL STATES: Chronic | ICD-10-CM

## 2024-03-12 DIAGNOSIS — Z3A.35 35 WEEKS GESTATION OF PREGNANCY: ICD-10-CM

## 2024-03-12 DIAGNOSIS — O26.899 OTHER SPECIFIED PREGNANCY RELATED CONDITIONS, UNSPECIFIED TRIMESTER: ICD-10-CM

## 2024-03-12 LAB
AMNISURE ROM (RUPTURE OF MEMBRANES): NEGATIVE — SIGNIFICANT CHANGE UP
BASOPHILS # BLD AUTO: 0.02 K/UL — SIGNIFICANT CHANGE UP (ref 0–0.2)
BASOPHILS NFR BLD AUTO: 0.2 % — SIGNIFICANT CHANGE UP (ref 0–2)
EOSINOPHIL # BLD AUTO: 0.02 K/UL — SIGNIFICANT CHANGE UP (ref 0–0.5)
EOSINOPHIL NFR BLD AUTO: 0.2 % — SIGNIFICANT CHANGE UP (ref 0–6)
HCT VFR BLD CALC: 37.3 % — SIGNIFICANT CHANGE UP (ref 34.5–45)
HGB BLD-MCNC: 12.6 G/DL — SIGNIFICANT CHANGE UP (ref 11.5–15.5)
IANC: 7.24 K/UL — SIGNIFICANT CHANGE UP (ref 1.8–7.4)
IMM GRANULOCYTES NFR BLD AUTO: 1.6 % — HIGH (ref 0–0.9)
LYMPHOCYTES # BLD AUTO: 1.55 K/UL — SIGNIFICANT CHANGE UP (ref 1–3.3)
LYMPHOCYTES # BLD AUTO: 16.6 % — SIGNIFICANT CHANGE UP (ref 13–44)
MCHC RBC-ENTMCNC: 29 PG — SIGNIFICANT CHANGE UP (ref 27–34)
MCHC RBC-ENTMCNC: 33.8 GM/DL — SIGNIFICANT CHANGE UP (ref 32–36)
MCV RBC AUTO: 85.9 FL — SIGNIFICANT CHANGE UP (ref 80–100)
MONOCYTES # BLD AUTO: 0.37 K/UL — SIGNIFICANT CHANGE UP (ref 0–0.9)
MONOCYTES NFR BLD AUTO: 4 % — SIGNIFICANT CHANGE UP (ref 2–14)
NEUTROPHILS # BLD AUTO: 7.24 K/UL — SIGNIFICANT CHANGE UP (ref 1.8–7.4)
NEUTROPHILS NFR BLD AUTO: 77.4 % — HIGH (ref 43–77)
NRBC # BLD: 0 /100 WBCS — SIGNIFICANT CHANGE UP (ref 0–0)
NRBC # FLD: 0 K/UL — SIGNIFICANT CHANGE UP (ref 0–0)
PLATELET # BLD AUTO: 239 K/UL — SIGNIFICANT CHANGE UP (ref 150–400)
RBC # BLD: 4.34 M/UL — SIGNIFICANT CHANGE UP (ref 3.8–5.2)
RBC # FLD: 15.8 % — HIGH (ref 10.3–14.5)
RH IG SCN BLD-IMP: POSITIVE — SIGNIFICANT CHANGE UP
WBC # BLD: 9.35 K/UL — SIGNIFICANT CHANGE UP (ref 3.8–10.5)
WBC # FLD AUTO: 9.35 K/UL — SIGNIFICANT CHANGE UP (ref 3.8–10.5)

## 2024-03-12 PROCEDURE — 76819 FETAL BIOPHYS PROFIL W/O NST: CPT

## 2024-03-12 PROCEDURE — 88307 TISSUE EXAM BY PATHOLOGIST: CPT | Mod: 26

## 2024-03-12 PROCEDURE — 59514 CESAREAN DELIVERY ONLY: CPT | Mod: AS,U8

## 2024-03-12 PROCEDURE — 88302 TISSUE EXAM BY PATHOLOGIST: CPT | Mod: 26

## 2024-03-12 PROCEDURE — 59025 FETAL NON-STRESS TEST: CPT | Mod: 26

## 2024-03-12 DEVICE — INTERCEED 3 X 4": Type: IMPLANTABLE DEVICE | Status: FUNCTIONAL

## 2024-03-12 RX ORDER — FAMOTIDINE 10 MG/ML
20 INJECTION INTRAVENOUS ONCE
Refills: 0 | Status: DISCONTINUED | OUTPATIENT
Start: 2024-03-12 | End: 2024-03-12

## 2024-03-12 RX ORDER — SODIUM CHLORIDE 9 MG/ML
500 INJECTION, SOLUTION INTRAVENOUS ONCE
Refills: 0 | Status: DISCONTINUED | OUTPATIENT
Start: 2024-03-12 | End: 2024-03-13

## 2024-03-12 RX ORDER — SODIUM CHLORIDE 9 MG/ML
1000 INJECTION, SOLUTION INTRAVENOUS ONCE
Refills: 0 | Status: DISCONTINUED | OUTPATIENT
Start: 2024-03-12 | End: 2024-03-13

## 2024-03-12 RX ORDER — OXYTOCIN 10 UNIT/ML
333.33 VIAL (ML) INJECTION
Qty: 20 | Refills: 0 | Status: DISCONTINUED | OUTPATIENT
Start: 2024-03-12 | End: 2024-03-13

## 2024-03-12 RX ORDER — SODIUM CHLORIDE 9 MG/ML
1000 INJECTION, SOLUTION INTRAVENOUS
Refills: 0 | Status: DISCONTINUED | OUTPATIENT
Start: 2024-03-12 | End: 2024-03-12

## 2024-03-12 RX ORDER — SODIUM CHLORIDE 9 MG/ML
1000 INJECTION, SOLUTION INTRAVENOUS
Refills: 0 | Status: DISCONTINUED | OUTPATIENT
Start: 2024-03-12 | End: 2024-03-13

## 2024-03-12 RX ORDER — TETANUS TOXOID, REDUCED DIPHTHERIA TOXOID AND ACELLULAR PERTUSSIS VACCINE, ADSORBED 5; 2.5; 8; 8; 2.5 [IU]/.5ML; [IU]/.5ML; UG/.5ML; UG/.5ML; UG/.5ML
0.5 SUSPENSION INTRAMUSCULAR ONCE
Refills: 0 | Status: DISCONTINUED | OUTPATIENT
Start: 2024-03-12 | End: 2024-03-14

## 2024-03-12 RX ORDER — OXYCODONE HYDROCHLORIDE 5 MG/1
5 TABLET ORAL
Refills: 0 | Status: DISCONTINUED | OUTPATIENT
Start: 2024-03-12 | End: 2024-03-14

## 2024-03-12 RX ORDER — CITRIC ACID/SODIUM CITRATE 300-500 MG
30 SOLUTION, ORAL ORAL ONCE
Refills: 0 | Status: DISCONTINUED | OUTPATIENT
Start: 2024-03-12 | End: 2024-03-12

## 2024-03-12 RX ORDER — MAGNESIUM HYDROXIDE 400 MG/1
30 TABLET, CHEWABLE ORAL
Refills: 0 | Status: DISCONTINUED | OUTPATIENT
Start: 2024-03-12 | End: 2024-03-14

## 2024-03-12 RX ORDER — CHLORHEXIDINE GLUCONATE 213 G/1000ML
1 SOLUTION TOPICAL DAILY
Refills: 0 | Status: DISCONTINUED | OUTPATIENT
Start: 2024-03-12 | End: 2024-03-12

## 2024-03-12 RX ORDER — SODIUM CHLORIDE 9 MG/ML
1000 INJECTION, SOLUTION INTRAVENOUS ONCE
Refills: 0 | Status: COMPLETED | OUTPATIENT
Start: 2024-03-12 | End: 2024-03-12

## 2024-03-12 RX ORDER — IBUPROFEN 200 MG
600 TABLET ORAL EVERY 6 HOURS
Refills: 0 | Status: COMPLETED | OUTPATIENT
Start: 2024-03-12 | End: 2025-02-08

## 2024-03-12 RX ORDER — HEPARIN SODIUM 5000 [USP'U]/ML
5000 INJECTION INTRAVENOUS; SUBCUTANEOUS EVERY 12 HOURS
Refills: 0 | Status: DISCONTINUED | OUTPATIENT
Start: 2024-03-12 | End: 2024-03-14

## 2024-03-12 RX ORDER — CITRIC ACID/SODIUM CITRATE 300-500 MG
30 SOLUTION, ORAL ORAL ONCE
Refills: 0 | Status: COMPLETED | OUTPATIENT
Start: 2024-03-12 | End: 2024-03-12

## 2024-03-12 RX ORDER — VALACYCLOVIR 500 MG/1
0 TABLET, FILM COATED ORAL
Refills: 0 | DISCHARGE

## 2024-03-12 RX ORDER — OXYCODONE HYDROCHLORIDE 5 MG/1
5 TABLET ORAL ONCE
Refills: 0 | Status: DISCONTINUED | OUTPATIENT
Start: 2024-03-12 | End: 2024-03-14

## 2024-03-12 RX ORDER — KETOROLAC TROMETHAMINE 30 MG/ML
30 SYRINGE (ML) INJECTION EVERY 6 HOURS
Refills: 0 | Status: DISCONTINUED | OUTPATIENT
Start: 2024-03-12 | End: 2024-03-13

## 2024-03-12 RX ORDER — SIMETHICONE 80 MG/1
80 TABLET, CHEWABLE ORAL EVERY 4 HOURS
Refills: 0 | Status: DISCONTINUED | OUTPATIENT
Start: 2024-03-12 | End: 2024-03-14

## 2024-03-12 RX ORDER — OXYTOCIN 10 UNIT/ML
333.33 VIAL (ML) INJECTION
Qty: 20 | Refills: 0 | Status: DISCONTINUED | OUTPATIENT
Start: 2024-03-12 | End: 2024-03-12

## 2024-03-12 RX ORDER — FAMOTIDINE 10 MG/ML
20 INJECTION INTRAVENOUS ONCE
Refills: 0 | Status: COMPLETED | OUTPATIENT
Start: 2024-03-12 | End: 2024-03-12

## 2024-03-12 RX ORDER — LANOLIN
1 OINTMENT (GRAM) TOPICAL EVERY 6 HOURS
Refills: 0 | Status: DISCONTINUED | OUTPATIENT
Start: 2024-03-12 | End: 2024-03-14

## 2024-03-12 RX ORDER — DIPHENHYDRAMINE HCL 50 MG
25 CAPSULE ORAL EVERY 6 HOURS
Refills: 0 | Status: DISCONTINUED | OUTPATIENT
Start: 2024-03-12 | End: 2024-03-14

## 2024-03-12 RX ORDER — CHLORHEXIDINE GLUCONATE 213 G/1000ML
1 SOLUTION TOPICAL DAILY
Refills: 0 | Status: DISCONTINUED | OUTPATIENT
Start: 2024-03-12 | End: 2024-03-13

## 2024-03-12 RX ORDER — ACETAMINOPHEN 500 MG
975 TABLET ORAL
Refills: 0 | Status: DISCONTINUED | OUTPATIENT
Start: 2024-03-12 | End: 2024-03-14

## 2024-03-12 RX ADMIN — FAMOTIDINE 20 MILLIGRAM(S): 10 INJECTION INTRAVENOUS at 16:23

## 2024-03-12 RX ADMIN — Medication 30 MILLIGRAM(S): at 23:36

## 2024-03-12 RX ADMIN — SODIUM CHLORIDE 2000 MILLILITER(S): 9 INJECTION, SOLUTION INTRAVENOUS at 15:08

## 2024-03-12 RX ADMIN — SODIUM CHLORIDE 125 MILLILITER(S): 9 INJECTION, SOLUTION INTRAVENOUS at 16:23

## 2024-03-12 RX ADMIN — CHLORHEXIDINE GLUCONATE 1 APPLICATION(S): 213 SOLUTION TOPICAL at 15:04

## 2024-03-12 RX ADMIN — Medication 30 MILLIGRAM(S): at 23:06

## 2024-03-12 RX ADMIN — Medication 30 MILLILITER(S): at 16:23

## 2024-03-12 NOTE — OB RN PATIENT PROFILE - FALL HARM RISK - UNIVERSAL INTERVENTIONS
Bed in lowest position, wheels locked, appropriate side rails in place/Call bell, personal items and telephone in reach/Instruct patient to call for assistance before getting out of bed or chair/Non-slip footwear when patient is out of bed/[place] to call system/Physically safe environment - no spills, clutter or unnecessary equipment/Purposeful Proactive Rounding/Room/bathroom lighting operational, light cord in reach

## 2024-03-12 NOTE — OB RN INTRAOPERATIVE NOTE - NSSELHIDDEN_OBGYN_ALL_OB_FT
[NS_DeliveryAttending1_OBGYN_ALL_OB_FT:[id number]==],[NS_DeliveryAssist1_OBGYN_ALL_OB_FT:[id number]==],[NS_DeliveryRN_OBGYN_ALL_OB_FT:[id number]] [NS_DeliveryAttending1_OBGYN_ALL_OB_FT:[id number]==],[NS_DeliveryAssist1_OBGYN_ALL_OB_FT:[id number]==],[NS_DeliveryRN_OBGYN_ALL_OB_FT:[id number]],[NS_CirculateRN2_OBGYN_ALL_OB_FT:[id number]==]

## 2024-03-12 NOTE — OB PROVIDER TRIAGE NOTE - NS_FETALPRESSONO_OBGYN_ALL_OB
No acute issues overnight  Lisinopril decreased by cardiology to 2.5 mg daily for soft blood pressure, CR 0.83--->1.17; BP better today 140s; will need further adjustment on follow up  Hb stable 12.5---12.5  Cleared by cardiology for discharge today    Will need outpatient cardiac rehabilitation, planned at [facility]     Please see discharge summary for details   Transverse

## 2024-03-12 NOTE — OB PROVIDER TRIAGE NOTE - HISTORY OF PRESENT ILLNESS
33 year old female P2 at 35.3  week gestation who present from ATU with feeling wetness and followed for POLY FELIZ 70 and has had 3 amnioreduction ( net of>2l each procedure )    and  feels cramping and discomfort of abdomen and back  but not worst from baseline   denied any worsening CP SOB     feels good        PNC Dr Gregory   POLY FELIZ 60-80        baby with suspected VSD     Latex sensitivity

## 2024-03-12 NOTE — OB PROVIDER DELIVERY SUMMARY - NSPROVIDERDELIVERYNOTE_OBGYN_ALL_OB_FT
Viable male infant, apgar 2/9, weight 2810 gms  delivered @17:32  cephalic presentation, SROM prior abdominal scrub, clear copious fluid   noted  hysterotomy closed in 2 layers  Surgicel placed over hysterotomy, peritoneum and rectus layer reapproximated, fascia layer closed using 0-Vicryl suture; bilateral salpingectomy performed using liga sure.   otherwise grossly normal uterus, tubes & ovaries    QBL: 263 cc  UOP: 100 cc  IVF: 2400 cc  Dictation Number: Viable male infant, Apgars 2/9, weight 2810 gms  delivered @17:32  SROM prior abdominal scrub, large amount of clear fluid noted  baby was delivered via cephalic presentation,   hysterotomy closed in 2 layers  Surgicel placed over hysterotomy, peritoneum and rectus layer reapproximated, fascia layer closed using 0-Vicryl suture; bilateral salpingectomy performed using Liga sure.   otherwise grossly normal uterus, tubes & ovaries    QBL: 263 cc  UOP: 100 cc  IVF: 2400 cc Viable male infant, Apgars 2/9, weight 2810 gms  delivered @17:32  SROM prior abdominal scrub, large amount of clear fluid noted  baby was delivered via cephalic presentation,   hysterotomy closed in 2 layers  Surgicel placed over hysterotomy, peritoneum and rectus layer reapproximated, fascia layer closed using 0-Vicryl suture; bilateral salpingectomy performed using Liga sure.   otherwise grossly normal uterus, tubes & ovaries    QBL: 263 cc  UOP: 100 cc  IVF: 2400 cc    dictation #[id number]

## 2024-03-12 NOTE — OB RN INTRAOPERATIVE NOTE - NS_ADDITIONALPROCINFO1_OBGYN_ALL_OB_FT
Report given to JENNIFER Vaughn RN for transfer of OR care. Report given to JENNIFER Vaughn RN for transfer of OR care.  cooler with PRBC'S returned back to blood bank @ 1820

## 2024-03-12 NOTE — OB RN DELIVERY SUMMARY - NSMATERNALFETALCONCERNS_OBGYN_ALL_OB_FT
Fetal Alert  24 - Severe polyhydramnios at 29 weeks. 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 Suggest an inpatient nonurgent echocardiogram on the baby after birth and prior to discharge from the nursery. If the baby is placed in the  intensive care unit for noncardiac reasons, then a nonurgent echocardiogram should be performed in the first 24 to 48 hours unless any other clinical concerns arise.See echo for FULL report. Diya Dubois RN.  s/p amnio REDUCTION x2 secondary to SEVERE POLYHYDRAMNIOS . S/P  inpatient NICU consult ; declined prenatal consult with Peds Surgery. s/p FETAL MDM 3/7/2024

## 2024-03-12 NOTE — OB NEONATOLOGY/PEDIATRICIAN DELIVERY SUMMARY - NSPEDSNEONOTESA_OBGYN_ALL_OB_FT
NICU called for severe polyhydramnios and chorioamniotic membrane separation. 35.3wk male born via CS to a 33 y/o  blood type O+ mother. Maternal history of HSV on valtrex with negative SSE and CF carrier, father is also a CF carrier. Prenatal history of severe polyhydramnios. PNL -/-/NR/I, GBS - on . SROM 20 minuted before delivery with copious clear fluids. Baby emerged limp, pale, with no respiratory effort was w/d/s/s with APGARS of 2/9. Patient with low heart rate and no respiratory effort, CPAP 5/21% intiated at 1 MOL but with no improvement PPV initiated at 1.5 MOL and ceased at 2 MOL. CPAP continued until 4 MOL. Patient noted to have some retractions and nasal flaring but maintaining good saturations, restarted on CPAP 5/21% at 11 MOL. Patient also noted to have significant abdominal distension at birth, with multiple high volume voids at the delivery. Patient stable for transport on RA to the NICU and bCPAP started in the NICU. Mom plans to initiate breastfeeding, consents Hep B vaccine and consents circ.

## 2024-03-12 NOTE — PROCEDURAL SAFETY CHECKLIST WITH OR WITHOUT SEDATION - NSINSTRUMENTCOUNTSD_GEN_ALL_CORE
-- DO NOT REPLY / DO NOT REPLY ALL --  -- Message is from the [facility]--    COVID-19 Universal Screening: Negative    General Patient Message      Reason for Call: Patient is requesting for her  medical note to be update per the request of her employer. It should state patient should be self-quarantined for 4 days and patient should return to work on 04/27/20 and not 05/27/2020. Please have Dr. Kimball fax letter to : [phone] with attn to: Kamari     Caller Information       Type Contact Phone    04/29/2020 04:07 PM Phone (Incoming) Amy Gaviria (Self) [phone] (M)          Alternative phone number: none  Turnaround time given to caller:   \"This message will be sent to [state Provider's name]. The clinical team will fulfill your request as soon as they review your message when the office opens tomorrow.\"    
Please advise
Please assist with patients return to work note.  Thank you 
Return to work letter updated. Patient advised access to letter through portal.  
done

## 2024-03-12 NOTE — OB NEONATOLOGY/PEDIATRICIAN DELIVERY SUMMARY - NSMATERNALFETALCONCERNS_OBGYN_ALL_OB_FT
Fetal Alert  24 - Severe polyhydramnios at 29 weeks. 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 Suggest an inpatient nonurgent echocardiogram on the baby after birth and prior to discharge from the nursery. If the baby is placed in the  intensive care unit for noncardiac reasons, then a nonurgent echocardiogram should be performed in the first 24 to 48 hours unless any other clinical concerns arise.See echo for FULL report. Diya Dubois RN.  s/p amnio REDUCTION x2 secondary to SEVERE POLYHYDRAMNIOS . S/P  inpatient NICU consult ; declined prenatal consult with Peds Surgery. s/p FETAL MDM 3/7/2024

## 2024-03-12 NOTE — OB PROVIDER TRIAGE NOTE - NSOBPROVIDERNOTE_OBGYN_ALL_OB_FT
33 year old female P2 at 35.3 week POLY FELIZ  70  transverse lie  for r/o Prom     for primary CS   today   ATU scan  confirmed suspected amnion separation     case d/w Dr Colt Webb in triage   decision confirmed for CS and  admission orders placed      consents obtained

## 2024-03-12 NOTE — OB PROVIDER H&P - NSMATERNALFETALCONCERNS_OBGYN_ALL_OB_FT
Fetal Alert  24 - Severe polyhydramnios at 29 weeks. 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 Suggest an inpatient nonurgent echocardiogram on the baby after birth and prior to discharge from the nursery. If the baby is placed in the  intensive care unit for noncardiac reasons, then a nonurgent echocardiogram should be performed in the first 24 to 48 hours unless any other clinical concerns arise.See echo for FULL report. Diya Dubois RN.  s/p amnio REDUCTION x2 secondary to SEVERE POLYHYDRAMNIOS . S/P  inpatient NICU consult ; declined prenatal consult with Peds Surgery. s/p FETAL MDM 3/7/2024

## 2024-03-12 NOTE — OB PROVIDER DELIVERY SUMMARY - NSMATERNALFETALCONCERNS_OBGYN_ALL_OB_FT
Fetal Alert  24 - Severe polyhydramnios at 29 weeks. 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 Suggest an inpatient nonurgent echocardiogram on the baby after birth and prior to discharge from the nursery. If the baby is placed in the  intensive care unit for noncardiac reasons, then a nonurgent echocardiogram should be performed in the first 24 to 48 hours unless any other clinical concerns arise.See echo for FULL report. Diya Dubois RN.  s/p amnio REDUCTION x2 secondary to SEVERE POLYHYDRAMNIOS . S/P  inpatient NICU consult ; declined prenatal consult with Peds Surgery. s/p FETAL MDM 3/7/2024

## 2024-03-12 NOTE — OB PROVIDER TRIAGE NOTE - NSMATERNALFETALCONCERNS_OBGYN_ALL_OB_FT
Fetal Alert  24 - Severe polyhydramnios at 29 weeks. 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 Suggest an inpatient nonurgent echocardiogram on the baby after birth and prior to discharge from the nursery. If the baby is placed in the  intensive care unit for noncardiac reasons, then a nonurgent echocardiogram should be performed in the first 24 to 48 hours unless any other clinical concerns arise.See echo for FULL report. Diya Dubois RN.  s/p amnio REDUCTION x2 secondary to SEVERE POLYHYDRAMNIOS . S/P  inpatient NICU consult ; declined prenatal consult with Peds Surgery. s/p FETAL MDM 3/7/2024

## 2024-03-12 NOTE — OB PROVIDER DELIVERY SUMMARY - AS DELIV COMPLICATIONS OB
Continue to monitor - ok to start antibiotic if you'd like although she could be getting one viral illness on top of the previous viral illness.    If you start the antibiotic, also give yogurt 1-2x/day or give a daily probiotic such as Culturelle for Kids or Floratummies.       other

## 2024-03-12 NOTE — OB PROVIDER TRIAGE NOTE - NSHPPHYSICALEXAM_GEN_ALL_CORE
pt seen and examined    ICU Vital Signs Last 24 Hrs  T(C): 36.7 (12 Mar 2024 13:16), Max: 36.7 (12 Mar 2024 12:49)  T(F): 98.06 (12 Mar 2024 13:16), Max: 98.1 (12 Mar 2024 12:49)  HR: 74 (12 Mar 2024 13:21) (74 - 85)  BP: 121/72 (12 Mar 2024 13:21) (113/61 - 121/72)  BP(mean): --  ABP: --  ABP(mean): --  RR: 16 (12 Mar 2024 12:49) (16 - 16)  SpO2: --    pt in NAD    lungs clear   heart s1 s2   abd soft  overdistend secondary to poly FELIZ 70    extremities  2-3 + edema R>L    no holmans    VE  SSE  cx closed  yeast discharge suspected       Nitz negative  fern negative   amnio sure negative    no  lesion noted

## 2024-03-12 NOTE — OB RN DELIVERY SUMMARY - NS_SEPSISRSKCALC_OBGYN_ALL_OB_FT
EOS calculated successfully. EOS Risk Factor: 0.5/1000 live births ([facility] national incidence); GA=35w3d; Temp=98.6; ROM=0.217; GBS='Negative'; Antibiotics='No antibiotics or any antibiotics < 2 hrs prior to birth'

## 2024-03-12 NOTE — PROCEDURAL SAFETY CHECKLIST WITH OR WITHOUT SEDATION - NSPOSTPXDISPO3SD_GEN_ALL_CORE
accidentally was kicked on R side of head at playground on 2/8, father states he has been his normal self since then but got a haircut today and noticed the swelling on his head was still present done

## 2024-03-12 NOTE — OB RN TRIAGE NOTE - NSMATERNALFETALCONCERNS_OBGYN_ALL_OB_FT
Fetal Alert  24 - Severe polyhydramnios at 29 weeks. 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 Suggest an inpatient nonurgent echocardiogram on the baby after birth and prior to discharge from the nursery. If the baby is placed in the  intensive care unit for noncardiac reasons, then a nonurgent echocardiogram should be performed in the first 24 to 48 hours unless any other clinical concerns arise.See echo for FULL report. Diya Dubois RN.  s/p amnio REDUCTION x2 secondary to SEVERE POLYHYDRAMNIOS . S/P  inpatient NICU consult ; declined prenatal consult with Peds Surgery. s/p FETAL MDM 3/7/2024

## 2024-03-12 NOTE — OB PROVIDER H&P - ASSESSMENT
33 year old female P2 at 35.3 week POLY FELIZ  70  transverse lie  for r/o Prom     for primary CS   today   ATU scan  confirmed suspected amnion separation     case d/w Dr Colt Webb in triage   decision confirmed for CS and  admission orders placed      consents obtained     ENRIQUETA GAONA

## 2024-03-12 NOTE — OB RN PATIENT PROFILE - NSMATERNALFETALCONCERNS_OBGYN_ALL_OB_FT
Fetal Alert  24 - Severe polyhydramnios at 29 weeks. 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 Suggest an inpatient nonurgent echocardiogram on the baby after birth and prior to discharge from the nursery. If the baby is placed in the  intensive care unit for noncardiac reasons, then a nonurgent echocardiogram should be performed in the first 24 to 48 hours unless any other clinical concerns arise.See echo for FULL report. Diya Dubois RN.  s/p amnio REDUCTION x2 secondary to SEVERE POLYHYDRAMNIOS . S/P  inpatient NICU consult ; declined prenatal consult with Peds Surgery. s/p FETAL MDM 3/7/2024

## 2024-03-12 NOTE — OB PROVIDER DELIVERY SUMMARY - NSLOWPPHRISK_OBGYN_A_OB
No previous uterine incision/[place]ac[place] Pregnancy/Less than or equal to 4 previous vaginal births/No known bleeding disorder

## 2024-03-12 NOTE — OB RN DELIVERY SUMMARY - NSSELHIDDEN_OBGYN_ALL_OB_FT
[NS_DeliveryAttending1_OBGYN_ALL_OB_FT:[id number]==],[NS_DeliveryAssist1_OBGYN_ALL_OB_FT:[id number]==],[NS_DeliveryRN_OBGYN_ALL_OB_FT:[id number]],[NS_CirculateRN2_OBGYN_ALL_OB_FT:[id number]==]

## 2024-03-13 LAB
BASOPHILS # BLD AUTO: 0.02 K/UL — SIGNIFICANT CHANGE UP (ref 0–0.2)
BASOPHILS NFR BLD AUTO: 0.1 % — SIGNIFICANT CHANGE UP (ref 0–2)
EOSINOPHIL # BLD AUTO: 0 K/UL — SIGNIFICANT CHANGE UP (ref 0–0.5)
EOSINOPHIL NFR BLD AUTO: 0 % — SIGNIFICANT CHANGE UP (ref 0–6)
HCT VFR BLD CALC: 30 % — LOW (ref 34.5–45)
HGB BLD-MCNC: 9.7 G/DL — LOW (ref 11.5–15.5)
IANC: 12.62 K/UL — HIGH (ref 1.8–7.4)
IMM GRANULOCYTES NFR BLD AUTO: 1.2 % — HIGH (ref 0–0.9)
LYMPHOCYTES # BLD AUTO: 1.33 K/UL — SIGNIFICANT CHANGE UP (ref 1–3.3)
LYMPHOCYTES # BLD AUTO: 8.9 % — LOW (ref 13–44)
MCHC RBC-ENTMCNC: 29.2 PG — SIGNIFICANT CHANGE UP (ref 27–34)
MCHC RBC-ENTMCNC: 32.3 GM/DL — SIGNIFICANT CHANGE UP (ref 32–36)
MCV RBC AUTO: 90.4 FL — SIGNIFICANT CHANGE UP (ref 80–100)
MONOCYTES # BLD AUTO: 0.72 K/UL — SIGNIFICANT CHANGE UP (ref 0–0.9)
MONOCYTES NFR BLD AUTO: 4.8 % — SIGNIFICANT CHANGE UP (ref 2–14)
NEUTROPHILS # BLD AUTO: 12.62 K/UL — HIGH (ref 1.8–7.4)
NEUTROPHILS NFR BLD AUTO: 85 % — HIGH (ref 43–77)
NRBC # BLD: 0 /100 WBCS — SIGNIFICANT CHANGE UP (ref 0–0)
NRBC # FLD: 0 K/UL — SIGNIFICANT CHANGE UP (ref 0–0)
PLATELET # BLD AUTO: 209 K/UL — SIGNIFICANT CHANGE UP (ref 150–400)
RBC # BLD: 3.32 M/UL — LOW (ref 3.8–5.2)
RBC # FLD: 15.9 % — HIGH (ref 10.3–14.5)
T PALLIDUM AB TITR SER: NEGATIVE — SIGNIFICANT CHANGE UP
WBC # BLD: 14.87 K/UL — HIGH (ref 3.8–10.5)
WBC # FLD AUTO: 14.87 K/UL — HIGH (ref 3.8–10.5)

## 2024-03-13 RX ORDER — IBUPROFEN 200 MG
600 TABLET ORAL EVERY 6 HOURS
Refills: 0 | Status: DISCONTINUED | OUTPATIENT
Start: 2024-03-13 | End: 2024-03-14

## 2024-03-13 RX ORDER — FERROUS SULFATE 325(65) MG
325 TABLET ORAL DAILY
Refills: 0 | Status: DISCONTINUED | OUTPATIENT
Start: 2024-03-13 | End: 2024-03-14

## 2024-03-13 RX ORDER — SENNA PLUS 8.6 MG/1
2 TABLET ORAL AT BEDTIME
Refills: 0 | Status: DISCONTINUED | OUTPATIENT
Start: 2024-03-13 | End: 2024-03-14

## 2024-03-13 RX ADMIN — Medication 30 MILLIGRAM(S): at 05:38

## 2024-03-13 RX ADMIN — Medication 30 MILLIGRAM(S): at 13:45

## 2024-03-13 RX ADMIN — HEPARIN SODIUM 5000 UNIT(S): 5000 INJECTION INTRAVENOUS; SUBCUTANEOUS at 18:12

## 2024-03-13 RX ADMIN — Medication 975 MILLIGRAM(S): at 02:12

## 2024-03-13 RX ADMIN — Medication 975 MILLIGRAM(S): at 07:58

## 2024-03-13 RX ADMIN — HEPARIN SODIUM 5000 UNIT(S): 5000 INJECTION INTRAVENOUS; SUBCUTANEOUS at 05:38

## 2024-03-13 RX ADMIN — Medication 975 MILLIGRAM(S): at 02:42

## 2024-03-13 RX ADMIN — Medication 975 MILLIGRAM(S): at 18:31

## 2024-03-13 RX ADMIN — Medication 30 MILLIGRAM(S): at 13:13

## 2024-03-13 RX ADMIN — Medication 975 MILLIGRAM(S): at 17:52

## 2024-03-13 RX ADMIN — Medication 30 MILLIGRAM(S): at 21:00

## 2024-03-13 RX ADMIN — Medication 975 MILLIGRAM(S): at 08:30

## 2024-03-13 RX ADMIN — Medication 30 MILLIGRAM(S): at 20:30

## 2024-03-13 RX ADMIN — Medication 30 MILLIGRAM(S): at 06:08

## 2024-03-13 NOTE — PROGRESS NOTE ADULT - SUBJECTIVE AND OBJECTIVE BOX
R1 Progress Note    Patient seen and examined at bedside, no acute overnight events. No acute complaints, pain well controlled. Patient is ambulating and tolerating regular diet. Has not yet passed flatus. Duque recently removed, pt is DTV. Denies CP, SOB, N/V, HA, blurred vision, epigastric pain.    Vital Signs Last 24 Hours  T(C): 36.3 (03-13-24 @ 02:21), Max: 37.0 (03-12-24 @ 15:00)  HR: 70 (03-13-24 @ 02:21) (57 - 85)  BP: 103/61 (03-13-24 @ 02:21) (102/73 - 135/84)  RR: 19 (03-13-24 @ 02:21) (12 - 24)  SpO2: 100% (03-13-24 @ 02:21) (99% - 100%)    I&O's Summary    12 Mar 2024 07:01  -  13 Mar 2024 05:41  --------------------------------------------------------  IN: 4112.5 mL / OUT: 988 mL / NET: 3124.5 mL        Physical Exam:  General: NAD  Abdomen: Soft, non-tender, non-distended, fundus firm  Incision: Pfannenstiel incision CDI, subcuticular suture closure, prineo dermabond dressing   Pelvic: Lochia wnl    Labs:    Blood Type: O Positive  Antibody Screen: --  RPR: Negative               12.6   9.35  )-----------( 239      ( 03-12 @ 14:57 )             37.3                10.9   7.84  )-----------( 228      ( 03-10 @ 13:57 )             33.3                11.1   10.32 )-----------( 247      ( 03-01 @ 05:50 )             33.9         MEDICATIONS  (STANDING):  acetaminophen     Tablet .. 975 milliGRAM(s) Oral <User Schedule>  chlorhexidine 2% Cloths 1 Application(s) Topical daily  diphtheria/tetanus/pertussis (acellular) Vaccine (Adacel) 0.5 milliLiter(s) IntraMuscular once  heparin   Injectable 5000 Unit(s) SubCutaneous every 12 hours  ibuprofen  Tablet. 600 milliGRAM(s) Oral every 6 hours  ketorolac   Injectable 30 milliGRAM(s) IV Push every 6 hours  lactated ringers Bolus 500 milliLiter(s) IV Bolus once  lactated ringers Bolus 1000 milliLiter(s) IV Bolus once  lactated ringers. 1000 milliLiter(s) (125 mL/Hr) IV Continuous <Continuous>  lactated ringers. 1000 milliLiter(s) (125 mL/Hr) IV Continuous <Continuous>  lactated ringers. 1000 milliLiter(s) (75 mL/Hr) IV Continuous <Continuous>  oxytocin Infusion 333.333 milliUNIT(s)/Min (1000 mL/Hr) IV Continuous <Continuous>  oxytocin Infusion 333.333 milliUNIT(s)/Min (1000 mL/Hr) IV Continuous <Continuous>    MEDICATIONS  (PRN):  diphenhydrAMINE 25 milliGRAM(s) Oral every 6 hours PRN Pruritus  lanolin Ointment 1 Application(s) Topical every 6 hours PRN Sore Nipples  magnesium hydroxide Suspension 30 milliLiter(s) Oral two times a day PRN Constipation  oxyCODONE    IR 5 milliGRAM(s) Oral once PRN Moderate to Severe Pain (4-10)  oxyCODONE    IR 5 milliGRAM(s) Oral every 3 hours PRN Moderate to Severe Pain (4-10)  simethicone 80 milliGRAM(s) Chew every 4 hours PRN Gas

## 2024-03-13 NOTE — PROGRESS NOTE ADULT - ASSESSMENT
32y/o  POD#1 from uncomplicated pLTCS (EBL 263mL) for polyhydramnios w/ transverse lie. Patient is hemodynamically stable and progressing well post-op.    #Postpartum state  - Continue with po analgesia  - Increase ambulation  - Continue regular diet  - Check CBC  - DVT prophylaxis with 5000u heparin    Deepti Herman PGY-1

## 2024-03-13 NOTE — BRIEF OPERATIVE NOTE - NSICDXBRIEFPREOP_GEN_ALL_CORE_FT
PRE-OP DIAGNOSIS:  Polyhydramnios 13-Mar-2024 08:03:55  Delmis Smith   PRE-OP DIAGNOSIS:  Polyhydramnios 13-Mar-2024 08:03:55  Delmis Smith  Multiparity 14-Mar-2024 12:55:34  Luh Leung  Request for sterilization 14-Mar-2024 12:55:49  Luh Leung  Term pregnancy 14-Mar-2024 12:56:12  Luh Leung

## 2024-03-13 NOTE — BRIEF OPERATIVE NOTE - NSICDXBRIEFPROCEDURE_GEN_ALL_CORE_FT
PROCEDURES:  Primary low transverse  section 13-Mar-2024 08:03:34  Delmis Smith   PROCEDURES:  Primary low transverse  section 13-Mar-2024 08:03:34  Delmis Smith  Bilateral salpingectomy 14-Mar-2024 12:56:43  Luh Leung

## 2024-03-13 NOTE — PROGRESS NOTE ADULT - SUBJECTIVE AND OBJECTIVE BOX
Postop Day  __1_ s/p   C- Section    THERAPY:  [ x ] Spinal morphine   [  ] Epidural morphine   [  ] IV PCA Hydromorphone 1 mg/ml            Pain:   _______0____ at rest;  ______2_____with activity    Sedation Score:	  [ x ] Alert	    [  ] Drowsy        [  ] Arousable	[  ] Asleep	[  ] Unresponsive    Side Effects:	  [ x ] None	     [  ] Nausea        [  ] Pruritus        [  ] Weakness   [  ] Numbness        ASSESSMENT/ PLAN  [   ] Side effects resolving      [  x ] Patient made aware of PRN meds available     [ ] Discontinue & switch to PRN pain medications        [  ] Continue       Patient states lower extremities feel and move normally. No apparent anesthetic complications.

## 2024-03-13 NOTE — BRIEF OPERATIVE NOTE - NSICDXBRIEFPOSTOP_GEN_ALL_CORE_FT
POST-OP DIAGNOSIS:  Delivery by  section using transverse incision of lower segment of uterus 13-Mar-2024 08:04:16  Delmis Smith

## 2024-03-13 NOTE — BRIEF OPERATIVE NOTE - OPERATION/FINDINGS
Viable male infant, Apgars 2/9, weight 2810 gms  delivered @17:32  SROM prior abdominal scrub, large amount of clear fluid noted  baby was delivered via cephalic presentation,   hysterotomy closed in 2 layers  Surgicel placed over hysterotomy, peritoneum and rectus layer reapproximated, fascia layer closed using 0-Vicryl suture; bilateral salpingectomy performed using Liga sure.   otherwise grossly normal uterus, tubes & ovaries    QBL: 263 cc  UOP: 100 cc  IVF: 2400 cc

## 2024-03-14 ENCOUNTER — APPOINTMENT (OUTPATIENT)
Dept: ANTEPARTUM | Facility: CLINIC | Age: 34
End: 2024-03-14

## 2024-03-14 ENCOUNTER — TRANSCRIPTION ENCOUNTER (OUTPATIENT)
Age: 34
End: 2024-03-14

## 2024-03-14 VITALS
SYSTOLIC BLOOD PRESSURE: 108 MMHG | RESPIRATION RATE: 17 BRPM | OXYGEN SATURATION: 100 % | DIASTOLIC BLOOD PRESSURE: 61 MMHG | HEART RATE: 100 BPM | TEMPERATURE: 99 F

## 2024-03-14 RX ORDER — IBUPROFEN 200 MG
1 TABLET ORAL
Qty: 0 | Refills: 0 | DISCHARGE
Start: 2024-03-14

## 2024-03-14 RX ORDER — OXYCODONE AND ACETAMINOPHEN 5; 325 MG/1; MG/1
1 TABLET ORAL
Qty: 20 | Refills: 0
Start: 2024-03-14

## 2024-03-14 RX ORDER — HEPARIN SODIUM 5000 [USP'U]/ML
5000 INJECTION INTRAVENOUS; SUBCUTANEOUS
Qty: 28 | Refills: 0
Start: 2024-03-14

## 2024-03-14 RX ORDER — FERROUS SULFATE 325(65) MG
1 TABLET ORAL
Qty: 0 | Refills: 0 | DISCHARGE
Start: 2024-03-14

## 2024-03-14 RX ORDER — SIMETHICONE 80 MG/1
1 TABLET, CHEWABLE ORAL
Qty: 0 | Refills: 0 | DISCHARGE
Start: 2024-03-14

## 2024-03-14 RX ADMIN — HEPARIN SODIUM 5000 UNIT(S): 5000 INJECTION INTRAVENOUS; SUBCUTANEOUS at 16:43

## 2024-03-14 RX ADMIN — Medication 975 MILLIGRAM(S): at 13:05

## 2024-03-14 RX ADMIN — Medication 600 MILLIGRAM(S): at 17:09

## 2024-03-14 RX ADMIN — Medication 600 MILLIGRAM(S): at 02:11

## 2024-03-14 RX ADMIN — HEPARIN SODIUM 5000 UNIT(S): 5000 INJECTION INTRAVENOUS; SUBCUTANEOUS at 05:02

## 2024-03-14 RX ADMIN — Medication 600 MILLIGRAM(S): at 02:41

## 2024-03-14 RX ADMIN — Medication 975 MILLIGRAM(S): at 00:42

## 2024-03-14 RX ADMIN — Medication 600 MILLIGRAM(S): at 16:43

## 2024-03-14 RX ADMIN — Medication 600 MILLIGRAM(S): at 09:15

## 2024-03-14 RX ADMIN — Medication 975 MILLIGRAM(S): at 05:02

## 2024-03-14 RX ADMIN — MAGNESIUM HYDROXIDE 30 MILLILITER(S): 400 TABLET, CHEWABLE ORAL at 08:35

## 2024-03-14 RX ADMIN — Medication 975 MILLIGRAM(S): at 00:12

## 2024-03-14 RX ADMIN — Medication 975 MILLIGRAM(S): at 13:40

## 2024-03-14 RX ADMIN — Medication 975 MILLIGRAM(S): at 05:32

## 2024-03-14 RX ADMIN — Medication 600 MILLIGRAM(S): at 08:36

## 2024-03-14 RX ADMIN — SIMETHICONE 80 MILLIGRAM(S): 80 TABLET, CHEWABLE ORAL at 08:36

## 2024-03-14 NOTE — DISCHARGE NOTE OB - CARE PROVIDER_API CALL
Luh Leung  Obstetrics and Gynecology  [address] [place] [address]  Phone: [phone]  Fax: [phone]  Follow Up Time:

## 2024-03-14 NOTE — DISCHARGE NOTE OB - PATIENT PORTAL LINK FT
You can access the FollowMyHealth Patient Portal offered by [facility] by registering at the following website: [email or web address]. By joining [employer]’s FollowMyHealth portal, you will also be able to view your health information using other applications (apps) compatible with our system.

## 2024-03-14 NOTE — PROGRESS NOTE ADULT - SUBJECTIVE AND OBJECTIVE BOX
S: 32yo POD#2 s/p pLTCS 2/2 amnion separation and severe polyhydramnios(FELIZ 70). , H/H 12.6/37.3->9.7/30.  Pain is well controlled. She is tolerating a regular diet and passing flatus. She is voiding spontaneously, and ambulating without difficulty. Denies CP/SOB. Denies lightheadedness/dizziness. Denies N/V.    O:  Vitals:  Vital Signs Last 24 Hrs  T(C): 36.9 (14 Mar 2024 04:55), Max: 36.9 (14 Mar 2024 04:55)  T(F): 98.4 (14 Mar 2024 04:55), Max: 98.4 (14 Mar 2024 04:55)  HR: 97 (14 Mar 2024 04:55) (80 - 97)  BP: 97/56 (14 Mar 2024 04:55) (93/54 - 105/67)  BP(mean): --  RR: 17 (14 Mar 2024 04:55) (17 - 18)  SpO2: 99% (14 Mar 2024 04:55) (99% - 100%)        MEDICATIONS  (STANDING):  acetaminophen     Tablet .. 975 milliGRAM(s) Oral <User Schedule>  diphtheria/tetanus/pertussis (acellular) Vaccine (Adacel) 0.5 milliLiter(s) IntraMuscular once  ferrous    sulfate 325 milliGRAM(s) Oral daily  heparin   Injectable 5000 Unit(s) SubCutaneous every 12 hours  ibuprofen  Tablet. 600 milliGRAM(s) Oral every 6 hours  prenatal multivitamin 1 Tablet(s) Oral daily  senna 2 Tablet(s) Oral at bedtime      MEDICATIONS  (PRN):  diphenhydrAMINE 25 milliGRAM(s) Oral every 6 hours PRN Pruritus  lanolin Ointment 1 Application(s) Topical every 6 hours PRN Sore Nipples  magnesium hydroxide Suspension 30 milliLiter(s) Oral two times a day PRN Constipation  oxyCODONE    IR 5 milliGRAM(s) Oral every 3 hours PRN Moderate to Severe Pain (4-10)  oxyCODONE    IR 5 milliGRAM(s) Oral once PRN Moderate to Severe Pain (4-10)  simethicone 80 milliGRAM(s) Chew every 4 hours PRN Gas      Labs:  Blood type: O Positive  Rubella IgG: RPR: Negative                          9.7<L>   14.87<H> >-----------< 209    ( 03-13 @ 05:00 )             30.0<L>                        12.6   9.35 >-----------< 239    ( 03-12 @ 14:57 )             37.3    PE:  General: NAD  Abdomen: Soft, appropriately tender, incision c/d/i.  Lochia: WNL  Extremities: No calf tenderness, mild pedal edema    A/P: 32yo POD#2 s/p pLTCS 2/2 amnion separation and severe polyhydramnios(FELIZ 70). Patient is stable and doing well post-operatively.     #Acute blood loss anemia  -  -H/H 12.6/37.3->9.7/30.   -Continue iron     #Post-Partum   - Continue regular diet.  - Encouraged use of abdominal binder.  - Increase ambulation.  - Continue Motrin, Tylenol, Oxycodone PRN for pain control.    -D/C plan- stable and desires discharge today    CORTNEY Flowers-BC S: 34yo POD#2 s/p pLTCS 2/2 amnion separation and severe polyhydramnios(FELIZ 70). , H/H 12.6/37.3->9.7/30.  Pain is well controlled. She is tolerating a regular diet and passing flatus. She is voiding spontaneously, and ambulating without difficulty. Denies CP/SOB. Denies lightheadedness/dizziness. Denies N/V.    O:  Vitals:  Vital Signs Last 24 Hrs  T(C): 36.9 (14 Mar 2024 04:55), Max: 36.9 (14 Mar 2024 04:55)  T(F): 98.4 (14 Mar 2024 04:55), Max: 98.4 (14 Mar 2024 04:55)  HR: 97 (14 Mar 2024 04:55) (80 - 97)  BP: 97/56 (14 Mar 2024 04:55) (93/54 - 105/67)  BP(mean): --  RR: 17 (14 Mar 2024 04:55) (17 - 18)  SpO2: 99% (14 Mar 2024 04:55) (99% - 100%)        MEDICATIONS  (STANDING):  acetaminophen     Tablet .. 975 milliGRAM(s) Oral <User Schedule>  diphtheria/tetanus/pertussis (acellular) Vaccine (Adacel) 0.5 milliLiter(s) IntraMuscular once  ferrous    sulfate 325 milliGRAM(s) Oral daily  heparin   Injectable 5000 Unit(s) SubCutaneous every 12 hours  ibuprofen  Tablet. 600 milliGRAM(s) Oral every 6 hours  prenatal multivitamin 1 Tablet(s) Oral daily  senna 2 Tablet(s) Oral at bedtime      MEDICATIONS  (PRN):  diphenhydrAMINE 25 milliGRAM(s) Oral every 6 hours PRN Pruritus  lanolin Ointment 1 Application(s) Topical every 6 hours PRN Sore Nipples  magnesium hydroxide Suspension 30 milliLiter(s) Oral two times a day PRN Constipation  oxyCODONE    IR 5 milliGRAM(s) Oral every 3 hours PRN Moderate to Severe Pain (4-10)  oxyCODONE    IR 5 milliGRAM(s) Oral once PRN Moderate to Severe Pain (4-10)  simethicone 80 milliGRAM(s) Chew every 4 hours PRN Gas      Labs:  Blood type: O Positive  Rubella IgG: RPR: Negative                          9.7<L>   14.87<H> >-----------< 209    ( 03-13 @ 05:00 )             30.0<L>                        12.6   9.35 >-----------< 239    ( 03-12 @ 14:57 )             37.3    PE:  General: NAD  Abdomen: Soft, appropriately tender, incision c/d/i.  Lochia: WNL  Extremities: No calf tenderness, mild pedal edema    A/P: 34yo POD#2 s/p pLTCS 2/2 amnion separation and severe polyhydramnios(FELIZ 70). Patient is stable and doing well post-operatively.     #Acute blood loss anemia  -  -H/H 12.6/37.3->9.7/30.   -Continue iron     #Post-Partum   - Continue regular diet.  - Encouraged use of abdominal binder.  - Increase ambulation.  - Continue Motrin, Tylenol, Oxycodone PRN for pain control.    -D/C planning in progress    CORTNEY Flowers-BC S: 34yo POD#2 s/p pLTCS with b/l salpingectomy 2/2 amnion separation and severe polyhydramnios(FELIZ 70). , H/H 12.6/37.3->9.7/30.  Pain is well controlled. She is tolerating a regular diet and passing flatus. She is voiding spontaneously, and ambulating without difficulty. Denies CP/SOB. Denies lightheadedness/dizziness. Denies N/V.    O:  Vitals:  Vital Signs Last 24 Hrs  T(C): 36.9 (14 Mar 2024 04:55), Max: 36.9 (14 Mar 2024 04:55)  T(F): 98.4 (14 Mar 2024 04:55), Max: 98.4 (14 Mar 2024 04:55)  HR: 97 (14 Mar 2024 04:55) (80 - 97)  BP: 97/56 (14 Mar 2024 04:55) (93/54 - 105/67)  BP(mean): --  RR: 17 (14 Mar 2024 04:55) (17 - 18)  SpO2: 99% (14 Mar 2024 04:55) (99% - 100%)        MEDICATIONS  (STANDING):  acetaminophen     Tablet .. 975 milliGRAM(s) Oral <User Schedule>  diphtheria/tetanus/pertussis (acellular) Vaccine (Adacel) 0.5 milliLiter(s) IntraMuscular once  ferrous    sulfate 325 milliGRAM(s) Oral daily  heparin   Injectable 5000 Unit(s) SubCutaneous every 12 hours  ibuprofen  Tablet. 600 milliGRAM(s) Oral every 6 hours  prenatal multivitamin 1 Tablet(s) Oral daily  senna 2 Tablet(s) Oral at bedtime      MEDICATIONS  (PRN):  diphenhydrAMINE 25 milliGRAM(s) Oral every 6 hours PRN Pruritus  lanolin Ointment 1 Application(s) Topical every 6 hours PRN Sore Nipples  magnesium hydroxide Suspension 30 milliLiter(s) Oral two times a day PRN Constipation  oxyCODONE    IR 5 milliGRAM(s) Oral every 3 hours PRN Moderate to Severe Pain (4-10)  oxyCODONE    IR 5 milliGRAM(s) Oral once PRN Moderate to Severe Pain (4-10)  simethicone 80 milliGRAM(s) Chew every 4 hours PRN Gas      Labs:  Blood type: O Positive  Rubella IgG: RPR: Negative                          9.7<L>   14.87<H> >-----------< 209    ( 03-13 @ 05:00 )             30.0<L>                        12.6   9.35 >-----------< 239    ( 03-12 @ 14:57 )             37.3    PE:  General: NAD  Abdomen: Soft, appropriately tender, incision c/d/i.  Lochia: WNL  Extremities: No calf tenderness, mild pedal edema    A/P: 34yo POD#2 s/p pLTCS 2/2 amnion separation and severe polyhydramnios(FELIZ 70). Patient is stable and doing well post-operatively.     #Acute blood loss anemia  -  -H/H 12.6/37.3->9.7/30.   -Continue iron     #Post-Partum   - Continue regular diet.  - Encouraged use of abdominal binder.  - Increase ambulation.  - Continue Motrin, Tylenol, Oxycodone PRN for pain control.    -D/C planning in progress    CORTNEY Flowers-BC

## 2024-03-14 NOTE — DISCHARGE NOTE OB - MEDICATION SUMMARY - MEDICATIONS TO TAKE
I will START or STAY ON the medications listed below when I get home from the hospital:    ibuprofen 600 mg oral tablet  -- 1 tab(s) by mouth every 6 hours  -- Indication: For mild-mod pain    oxycodone-acetaminophen 5 mg-325 mg oral tablet  -- 1 tab(s) by mouth 4 times a day as needed for  moderate pain MDD: 4  -- Indication: For Mod-Severe pain    heparin 5000 units/mL injectable solution  -- 5,000 unit(s) subcutaneously 2 times a day  -- Indication: For Prevent DVTs    Prenatal Multivitamins with Folic Acid 1 mg oral tablet  -- 1 tab(s) by mouth once a day  -- Indication: For Other pregnancy-related conditions, antepartum    ferrous sulfate 325 mg (65 mg elemental iron) oral tablet  -- 1 tab(s) by mouth once a day  -- Indication: For Anemia    simethicone 80 mg oral tablet, chewable  -- 1 tab(s) by mouth every 4 hours As needed Gas  -- Indication: For Gas pain

## 2024-03-14 NOTE — DISCHARGE NOTE OB - HOSPITAL COURSE
Patient admitted for delivery secondary to severe Polyhdramnios as recommended by M. Had uncomplicated primary  section and discharged home of POD#2.

## 2024-03-15 ENCOUNTER — APPOINTMENT (OUTPATIENT)
Dept: ANTEPARTUM | Facility: CLINIC | Age: 34
End: 2024-03-15

## 2024-03-19 ENCOUNTER — APPOINTMENT (OUTPATIENT)
Dept: ANTEPARTUM | Facility: CLINIC | Age: 34
End: 2024-03-19

## 2024-03-21 ENCOUNTER — APPOINTMENT (OUTPATIENT)
Dept: ANTEPARTUM | Facility: CLINIC | Age: 34
End: 2024-03-21

## 2024-03-22 ENCOUNTER — APPOINTMENT (OUTPATIENT)
Dept: ANTEPARTUM | Facility: CLINIC | Age: 34
End: 2024-03-22

## 2024-03-28 ENCOUNTER — APPOINTMENT (OUTPATIENT)
Dept: ANTEPARTUM | Facility: CLINIC | Age: 34
End: 2024-03-28

## 2024-03-28 LAB — SURGICAL PATHOLOGY STUDY: SIGNIFICANT CHANGE UP

## 2024-04-04 ENCOUNTER — APPOINTMENT (OUTPATIENT)
Dept: ANTEPARTUM | Facility: CLINIC | Age: 34
End: 2024-04-04

## 2024-04-11 ENCOUNTER — APPOINTMENT (OUTPATIENT)
Dept: ANTEPARTUM | Facility: CLINIC | Age: 34
End: 2024-04-11

## 2024-04-30 NOTE — OB PROVIDER DELIVERY SUMMARY - NSSELHIDDEN_OBGYN_ALL_OB_FT
Detail Level: Detailed Depth Of Biopsy: dermis Was A Bandage Applied: Yes Lab: 6 Size Of Lesion In Cm: 0.7 X Size Of Lesion In Cm: 0 Biopsy Type: H and E Biopsy Method: Dermablade Anesthesia Type: 0.5% lidocaine without epinephrine Anesthesia Volume In Cc: 0.1 Hemostasis: Aluminum Chloride Wound Care: Aquaphor Dressing: bandage Destruction After The Procedure: No Type Of Destruction Used: Electrodesiccation and Curettage Curettage Text: The wound bed was treated with curettage after the biopsy was performed. Cryotherapy Text: The wound bed was treated with cryotherapy after the biopsy was performed. Electrodesiccation Text: The wound bed was treated with electrodesiccation after the biopsy was performed. Electrodesiccation And Curettage Text: The wound bed was treated with electrodesiccation and curettage after the biopsy was performed. Silver Nitrate Text: The wound bed was treated with silver nitrate after the biopsy was performed. Path Notes (To The Dermatopathologist): check margins Consent: Verbal consent was obtained and risks were reviewed including but not limited to scarring, infection, bleeding, scabbing, incomplete removal, nerve damage and allergy to anesthesia. Post-Care Instructions: I reviewed with the patient in detail post-care instructions. Patient is to keep the biopsy site dry overnight.  BID wound care instructions given and reviewed with pt.  Apply vasolene or aquaphor BID x 7-10 days.  Pt is aware bx results is a round white scar. Notification Instructions: Patient will be notified of biopsy results. However, patient instructed to call the office if not contacted within 2 weeks. Billing Type: Third-Party Bill Information: Selecting Yes will display possible errors in your note based on the variables you have selected. This validation is only offered as a suggestion for you. PLEASE NOTE THAT THE VALIDATION TEXT WILL BE REMOVED WHEN YOU FINALIZE YOUR NOTE. IF YOU WANT TO FAX A PRELIMINARY NOTE YOU WILL NEED TO TOGGLE THIS TO 'NO' IF YOU DO NOT WANT IT IN YOUR FAXED NOTE. [NS_[email or web address]_ALL_OB_FT:[id number]],[NS_DeliveryAssist1_OBGYN_ALL_OB_FT:[id number]],[NS_DeliveryRN_OBGYN_ALL_OB_FT:[id number]],[NS_CirculateRN2_OBGYN_ALL_OB_FT:[id number]]

## 2024-09-06 ENCOUNTER — APPOINTMENT (OUTPATIENT)
Dept: INTERNAL MEDICINE | Facility: CLINIC | Age: 34
End: 2024-09-06
Payer: COMMERCIAL

## 2024-09-06 VITALS
BODY MASS INDEX: 28.89 KG/M2 | HEART RATE: 95 BPM | OXYGEN SATURATION: 97 % | SYSTOLIC BLOOD PRESSURE: 102 MMHG | DIASTOLIC BLOOD PRESSURE: 62 MMHG | WEIGHT: 153 LBS | HEIGHT: 61 IN

## 2024-09-06 DIAGNOSIS — Z82.49 FAMILY HISTORY OF ISCHEMIC HEART DISEASE AND OTHER DISEASES OF THE CIRCULATORY SYSTEM: ICD-10-CM

## 2024-09-06 DIAGNOSIS — R45.89 OTHER SYMPTOMS AND SIGNS INVOLVING EMOTIONAL STATE: ICD-10-CM

## 2024-09-06 DIAGNOSIS — Z00.00 ENCOUNTER FOR GENERAL ADULT MEDICAL EXAMINATION W/OUT ABNORMAL FINDINGS: ICD-10-CM

## 2024-09-06 DIAGNOSIS — Z80.0 FAMILY HISTORY OF MALIGNANT NEOPLASM OF DIGESTIVE ORGANS: ICD-10-CM

## 2024-09-06 DIAGNOSIS — Z83.79 FAMILY HISTORY OF OTHER DISEASES OF THE DIGESTIVE SYSTEM: ICD-10-CM

## 2024-09-06 DIAGNOSIS — E66.3 OVERWEIGHT: ICD-10-CM

## 2024-09-06 PROCEDURE — 36415 COLL VENOUS BLD VENIPUNCTURE: CPT

## 2024-09-06 PROCEDURE — 99385 PREV VISIT NEW AGE 18-39: CPT

## 2024-09-06 NOTE — ASSESSMENT
[FreeTextEntry1] : 34F w/ no significant PMH is coming in for CPE, and to establish care.  #Depressed mood -PHQ-2 negative, but patient states she occasionally has depressed mood/anxiety, sometimes overwhelmed with kids and work. discussed treatment options including Psych referral for CBT and/or SSRIs. Patient declines at this time but will CTM and let us know if she is interested  #Overweight  Encouraged lifestyle modification by cutting down on sugar, saturated & trans fats. Encouraged 150 minutes a week of moderate intensity exercise as tolerated. -A1C ordered  #HCM -Labs as below -Patient up to date on Pap smear, had TDAP during recent pregnancy -Declines flu and updated COVID vaccines  -Patient to get first colonoscopy and mammogram in the coming months  RTC in 1 year or prn

## 2024-09-06 NOTE — HEALTH RISK ASSESSMENT
[0] : 1) Little interest or pleasure doing things: Not at all (0) [1] : 2) Feeling down, depressed, or hopeless for several days (1) [PHQ-2 Negative - No further assessment needed] : PHQ-2 Negative - No further assessment needed [Patient reported PAP Smear was normal] : Patient reported PAP Smear was normal [Never (0 pts)] : Never (0 points) [No] : In the past 12 months have you used drugs other than those required for medical reasons? No [With Family] : lives with family [] :  [# Of Children ___] : has [unfilled] children [Fully functional (bathing, dressing, toileting, transferring, walking, feeding)] : Fully functional (bathing, dressing, toileting, transferring, walking, feeding) [Fully functional (using the telephone, shopping, preparing meals, housekeeping, doing laundry, using] : Fully functional and needs no help or supervision to perform IADLs (using the telephone, shopping, preparing meals, housekeeping, doing laundry, using transportation, managing medications and managing finances) [Never] : Never [LSS5Eppsf] : 1 [Audit-CScore] : 0 [PapSmearDate] : 05/24

## 2024-09-06 NOTE — HISTORY OF PRESENT ILLNESS
[Spouse] : spouse [FreeTextEntry1] : 34F w/ no significant PMH is coming in for CPE, and to establish care. [de-identified] : 34F w/ no significant PMH is coming in for CPE, and to establish care.  Sometimes overwhelmed, due to kids and working, has a 5 month old.   Following w/ GYN annually. Says GYN is recommending for her to have mammograms soon  Going to see GI soon for colonoscopy - mother  of colon cancer in her 50s.

## 2024-09-06 NOTE — PHYSICAL EXAM
[Normal Sclera/Conjunctiva] : normal sclera/conjunctiva [PERRL] : pupils equal round and reactive to light [EOMI] : extraocular movements intact [No Lymphadenopathy] : no lymphadenopathy [Supple] : supple [Thyroid Normal, No Nodules] : the thyroid was normal and there were no nodules present [No Respiratory Distress] : no respiratory distress  [No Accessory Muscle Use] : no accessory muscle use [Clear to Auscultation] : lungs were clear to auscultation bilaterally [Normal Rate] : normal rate  [Regular Rhythm] : with a regular rhythm [No Edema] : there was no peripheral edema [Soft] : abdomen soft [Non Tender] : non-tender [Non-distended] : non-distended [Normal Supraclavicular Nodes] : no supraclavicular lymphadenopathy [Normal Anterior Cervical Nodes] : no anterior cervical lymphadenopathy [No Rash] : no rash [Coordination Grossly Intact] : coordination grossly intact [No Focal Deficits] : no focal deficits [Normal] : affect was normal and insight and judgment were intact

## 2024-09-09 ENCOUNTER — NON-APPOINTMENT (OUTPATIENT)
Age: 34
End: 2024-09-09

## 2024-09-09 LAB
ALBUMIN SERPL ELPH-MCNC: 4.4 G/DL
ALP BLD-CCNC: 74 U/L
ALT SERPL-CCNC: 8 U/L
ANION GAP SERPL CALC-SCNC: 12 MMOL/L
AST SERPL-CCNC: 16 U/L
BILIRUB SERPL-MCNC: 0.3 MG/DL
BUN SERPL-MCNC: 11 MG/DL
CALCIUM SERPL-MCNC: 9.2 MG/DL
CHLORIDE SERPL-SCNC: 103 MMOL/L
CHOLEST SERPL-MCNC: 188 MG/DL
CO2 SERPL-SCNC: 25 MMOL/L
CREAT SERPL-MCNC: 0.82 MG/DL
EGFR: 96 ML/MIN/1.73M2
ESTIMATED AVERAGE GLUCOSE: 108 MG/DL
GLUCOSE SERPL-MCNC: 93 MG/DL
HBA1C MFR BLD HPLC: 5.4 %
HCT VFR BLD CALC: 42 %
HDLC SERPL-MCNC: 83 MG/DL
HGB BLD-MCNC: 13.4 G/DL
LDLC SERPL CALC-MCNC: 89 MG/DL
MCHC RBC-ENTMCNC: 28.7 PG
MCHC RBC-ENTMCNC: 31.9 GM/DL
MCV RBC AUTO: 89.9 FL
NONHDLC SERPL-MCNC: 105 MG/DL
PLATELET # BLD AUTO: 336 K/UL
POTASSIUM SERPL-SCNC: 4.1 MMOL/L
PROT SERPL-MCNC: 7.3 G/DL
RBC # BLD: 4.67 M/UL
RBC # FLD: 14.8 %
SODIUM SERPL-SCNC: 139 MMOL/L
TRIGL SERPL-MCNC: 95 MG/DL
TSH SERPL-ACNC: 1.15 UIU/ML
WBC # FLD AUTO: 5.92 K/UL

## 2024-12-12 NOTE — OB PROVIDER TRIAGE NOTE - NS_LMP_OBGYN_ALL_OB_DT
08-Jul-2023 [Well Nourished] : well nourished [Well Developed] : well developed [Alert] : ~L alert [Active] : active [Normal Breathing Pattern] : normal breathing pattern [No Respiratory Distress] : no respiratory distress [No Allergic Shiners] : no allergic shiners [No Drainage] : no drainage [No Conjunctivitis] : no conjunctivitis [No Nasal Drainage] : no nasal drainage [No Oral Pallor] : no oral pallor [No Oral Cyanosis] : no oral cyanosis [Non-Erythematous] : non-erythematous [No Exudates] : no exudates [No Postnasal Drip] : no postnasal drip [No Tonsillar Enlargement] : no tonsillar enlargement [Absence Of Retractions] : absence of retractions [Symmetric] : symmetric [Good Expansion] : good expansion [No Acc Muscle Use] : no accessory muscle use [Good aeration to bases] : good aeration to bases [Equal Breath Sounds] : equal breath sounds bilaterally [No Crackles] : no crackles [No Rhonchi] : no rhonchi [No Wheezing] : no wheezing [Normal Sinus Rhythm] : normal sinus rhythm [No Heart Murmur] : no heart murmur [Full ROM] : full range of motion [No Clubbing] : no clubbing [Capillary Refill < 2 secs] : capillary refill less than two seconds [No Cyanosis] : no cyanosis [No Petechiae] : no petechiae [Alert and  Oriented] : alert and oriented [No Rashes] : no rashes [No Skin Lesions] : no skin lesions [FreeTextEntry4] : nixon, nasal mucosa

## 2025-02-05 ENCOUNTER — NON-APPOINTMENT (OUTPATIENT)
Age: 35
End: 2025-02-05

## 2025-03-05 ENCOUNTER — NON-APPOINTMENT (OUTPATIENT)
Age: 35
End: 2025-03-05

## 2025-06-06 NOTE — OB NEONATOLOGY/PEDIATRICIAN DELIVERY SUMMARY - BABY A: APGAR 1 MIN HEART RATE, DELIVERY
(1) less than 100 beats/min I will START or STAY ON the medications listed below when I get home from the hospital:    ibuprofen 600 mg oral tablet  -- 1 tab(s) by mouth every 6 hours  -- Indication: For Pain    acetaminophen 325 mg oral tablet  -- 3 tab(s) by mouth every 6 hours  -- Indication: For Pain    Prenatal Multivitamins with Folic Acid 1 mg oral tablet  -- 1 tab(s) by mouth once a day  -- Indication: For Health

## 2025-06-22 NOTE — OB PROVIDER H&P - NSMATERNALFETALCONCERNS_OBGYN_ALL_OB_FT
Fetal Alert  24 - Severe polyhydramnios at 29 weeks. 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 Suggest an inpatient nonurgent echocardiogram on the baby after birth and prior to discharge from the nursery. If the baby is placed in the  intensive care unit for noncardiac reasons, then a nonurgent echocardiogram should be performed in the first 24 to 48 hours unless any other clinical concerns arise.See echo for FULL report. Diya Dubois RN.  s/p amnio REDUCTION 2/15/2024 . Inpatient NICU consult ; declined prenatal consult with Peds Surgery     calm

## 2025-06-23 ENCOUNTER — OUTPATIENT (OUTPATIENT)
Dept: OUTPATIENT SERVICES | Facility: HOSPITAL | Age: 35
LOS: 1 days | End: 2025-06-23
Payer: COMMERCIAL

## 2025-06-23 ENCOUNTER — APPOINTMENT (OUTPATIENT)
Dept: MAMMOGRAPHY | Facility: IMAGING CENTER | Age: 35
End: 2025-06-23
Payer: COMMERCIAL

## 2025-06-23 DIAGNOSIS — Z98.890 OTHER SPECIFIED POSTPROCEDURAL STATES: Chronic | ICD-10-CM

## 2025-06-23 DIAGNOSIS — Z00.8 ENCOUNTER FOR OTHER GENERAL EXAMINATION: ICD-10-CM

## 2025-06-23 PROCEDURE — 77067 SCR MAMMO BI INCL CAD: CPT | Mod: 26

## 2025-06-23 PROCEDURE — 77067 SCR MAMMO BI INCL CAD: CPT

## 2025-06-23 PROCEDURE — 77063 BREAST TOMOSYNTHESIS BI: CPT

## 2025-06-23 PROCEDURE — 77063 BREAST TOMOSYNTHESIS BI: CPT | Mod: 26

## 2025-06-30 ENCOUNTER — OUTPATIENT (OUTPATIENT)
Dept: OUTPATIENT SERVICES | Facility: HOSPITAL | Age: 35
LOS: 1 days | End: 2025-06-30

## 2025-06-30 VITALS
WEIGHT: 171.3 LBS | SYSTOLIC BLOOD PRESSURE: 102 MMHG | DIASTOLIC BLOOD PRESSURE: 69 MMHG | OXYGEN SATURATION: 100 % | HEIGHT: 65 IN | HEART RATE: 74 BPM | RESPIRATION RATE: 18 BRPM | TEMPERATURE: 98 F

## 2025-06-30 DIAGNOSIS — K42.9 UMBILICAL HERNIA WITHOUT OBSTRUCTION OR GANGRENE: Chronic | ICD-10-CM

## 2025-06-30 DIAGNOSIS — Z01.818 ENCOUNTER FOR OTHER PREPROCEDURAL EXAMINATION: ICD-10-CM

## 2025-06-30 DIAGNOSIS — Z98.891 HISTORY OF UTERINE SCAR FROM PREVIOUS SURGERY: Chronic | ICD-10-CM

## 2025-06-30 DIAGNOSIS — N92.1 EXCESSIVE AND FREQUENT MENSTRUATION WITH IRREGULAR CYCLE: ICD-10-CM

## 2025-06-30 DIAGNOSIS — N92.0 EXCESSIVE AND FREQUENT MENSTRUATION WITH REGULAR CYCLE: ICD-10-CM

## 2025-06-30 DIAGNOSIS — Z98.890 OTHER SPECIFIED POSTPROCEDURAL STATES: Chronic | ICD-10-CM

## 2025-06-30 LAB
ANION GAP SERPL CALC-SCNC: 5 MMOL/L — SIGNIFICANT CHANGE UP (ref 5–17)
APPEARANCE UR: CLEAR — SIGNIFICANT CHANGE UP
BASOPHILS # BLD AUTO: 0.04 K/UL — SIGNIFICANT CHANGE UP (ref 0–0.2)
BASOPHILS NFR BLD AUTO: 0.5 % — SIGNIFICANT CHANGE UP (ref 0–2)
BILIRUB UR-MCNC: NEGATIVE — SIGNIFICANT CHANGE UP
BUN SERPL-MCNC: 12 MG/DL — SIGNIFICANT CHANGE UP (ref 7–23)
CALCIUM SERPL-MCNC: 9.1 MG/DL — SIGNIFICANT CHANGE UP (ref 8.5–10.1)
CHLORIDE SERPL-SCNC: 109 MMOL/L — HIGH (ref 96–108)
CO2 SERPL-SCNC: 24 MMOL/L — SIGNIFICANT CHANGE UP (ref 22–31)
COLOR SPEC: YELLOW — SIGNIFICANT CHANGE UP
CREAT SERPL-MCNC: 0.78 MG/DL — SIGNIFICANT CHANGE UP (ref 0.5–1.3)
DIFF PNL FLD: ABNORMAL
EGFR: 102 ML/MIN/1.73M2 — SIGNIFICANT CHANGE UP
EGFR: 102 ML/MIN/1.73M2 — SIGNIFICANT CHANGE UP
EOSINOPHIL # BLD AUTO: 0.09 K/UL — SIGNIFICANT CHANGE UP (ref 0–0.5)
EOSINOPHIL NFR BLD AUTO: 1.2 % — SIGNIFICANT CHANGE UP (ref 0–6)
GLUCOSE SERPL-MCNC: 97 MG/DL — SIGNIFICANT CHANGE UP (ref 70–99)
GLUCOSE UR QL: NEGATIVE MG/DL — SIGNIFICANT CHANGE UP
HCG SERPL-ACNC: <1 MIU/ML — SIGNIFICANT CHANGE UP
HCT VFR BLD CALC: 38.3 % — SIGNIFICANT CHANGE UP (ref 34.5–45)
HGB BLD-MCNC: 12.9 G/DL — SIGNIFICANT CHANGE UP (ref 11.5–15.5)
IMM GRANULOCYTES NFR BLD AUTO: 0.3 % — SIGNIFICANT CHANGE UP (ref 0–0.9)
KETONES UR QL: NEGATIVE MG/DL — SIGNIFICANT CHANGE UP
LEUKOCYTE ESTERASE UR-ACNC: NEGATIVE — SIGNIFICANT CHANGE UP
LYMPHOCYTES # BLD AUTO: 2.44 K/UL — SIGNIFICANT CHANGE UP (ref 1–3.3)
LYMPHOCYTES # BLD AUTO: 32.6 % — SIGNIFICANT CHANGE UP (ref 13–44)
MCHC RBC-ENTMCNC: 28.9 PG — SIGNIFICANT CHANGE UP (ref 27–34)
MCHC RBC-ENTMCNC: 33.7 G/DL — SIGNIFICANT CHANGE UP (ref 32–36)
MCV RBC AUTO: 85.7 FL — SIGNIFICANT CHANGE UP (ref 80–100)
MONOCYTES # BLD AUTO: 0.37 K/UL — SIGNIFICANT CHANGE UP (ref 0–0.9)
MONOCYTES NFR BLD AUTO: 4.9 % — SIGNIFICANT CHANGE UP (ref 2–14)
NEUTROPHILS # BLD AUTO: 4.53 K/UL — SIGNIFICANT CHANGE UP (ref 1.8–7.4)
NEUTROPHILS NFR BLD AUTO: 60.5 % — SIGNIFICANT CHANGE UP (ref 43–77)
NITRITE UR-MCNC: NEGATIVE — SIGNIFICANT CHANGE UP
NRBC BLD AUTO-RTO: 0 /100 WBCS — SIGNIFICANT CHANGE UP (ref 0–0)
PH UR: 5.5 — SIGNIFICANT CHANGE UP (ref 5–8)
PLATELET # BLD AUTO: 331 K/UL — SIGNIFICANT CHANGE UP (ref 150–400)
POTASSIUM SERPL-MCNC: 3.5 MMOL/L — SIGNIFICANT CHANGE UP (ref 3.5–5.3)
POTASSIUM SERPL-SCNC: 3.5 MMOL/L — SIGNIFICANT CHANGE UP (ref 3.5–5.3)
PROT UR-MCNC: NEGATIVE MG/DL — SIGNIFICANT CHANGE UP
RBC # BLD: 4.47 M/UL — SIGNIFICANT CHANGE UP (ref 3.8–5.2)
RBC # FLD: 15.2 % — HIGH (ref 10.3–14.5)
SODIUM SERPL-SCNC: 138 MMOL/L — SIGNIFICANT CHANGE UP (ref 135–145)
SP GR SPEC: 1.03 — SIGNIFICANT CHANGE UP (ref 1–1.03)
UROBILINOGEN FLD QL: 1 MG/DL — SIGNIFICANT CHANGE UP (ref 0.2–1)
WBC # BLD: 7.49 K/UL — SIGNIFICANT CHANGE UP (ref 3.8–10.5)
WBC # FLD AUTO: 7.49 K/UL — SIGNIFICANT CHANGE UP (ref 3.8–10.5)

## 2025-06-30 NOTE — H&P PST ADULT - ASSESSMENT
menorrhagia  CAPRINI SCORE    AGE RELATED RISK FACTORS                                                             [ ] Age 41-60 years                                            (1 Point)  [ ] Age: 61-74 years                                           (2 Points)                 [ ] Age= 75 years                                                (3 Points)             DISEASE RELATED RISK FACTORS                                                       [ ] Edema in the lower extremities                 (1 Point)                     [ ] Varicose veins                                               (1 Point)                                 [x ] BMI > 25 Kg/m2                                            (1 Point)                                  [ ] Serious infection (ie PNA)                            (1 Point)                     [ ] Lung disease ( COPD, Emphysema)            (1 Point)                                                                          [ ] Acute myocardial infarction                         (1 Point)                  [ ] Congestive heart failure (in the previous month)  (1 Point)         [ ] Inflammatory bowel disease                            (1 Point)                  [ ] Central venous access, PICC or Port               (2 points)       (within the last month)                                                                [ ] Stroke (in the previous month)                        (5 Points)    [ ] Previous or present malignancy                       (2 points)                                                                                                                                                         HEMATOLOGY RELATED FACTORS                                                         [ ] Prior episodes of VTE                                     (3 Points)                     [ ] Positive family history for VTE                      (3 Points)                  [ ] Prothrombin 78567 A                                     (3 Points)                     [ ] Factor V Leiden                                                (3 Points)                        [ ] Lupus anticoagulants                                      (3 Points)                                                           [ ] Anticardiolipin antibodies                              (3 Points)                                                       [ ] High homocysteine in the blood                   (3 Points)                                             [ ] Other congenital or acquired thrombophilia      (3 Points)                                                [ ] Heparin induced thrombocytopenia                  (3 Points)                                        MOBILITY RELATED FACTORS  [ ] Bed rest                                                         (1 Point)  [ ] Plaster cast                                                    (2 points)  [ ] Bed bound for more than 72 hours           (2 Points)    GENDER SPECIFIC FACTORS  [ ] Pregnancy or had a baby within the last month   (1 Point)  [ ] Post-partum < 6 weeks                                   (1 Point)  [ ] Hormonal therapy  or oral contraception   (1 Point)  [ ] History of pregnancy complications              (1 point)  [ ] Unexplained or recurrent              (1 Point)    OTHER RISK FACTORS                                           (1 Point)  [ ] BMI >40, smoking, diabetes requiring insulin, chemotherapy  blood transfusions and length of surgery over 2 hours    SURGERY RELATED RISK FACTORS  [ ]  Section within the last month     (1 Point)  [ ] Minor surgery                                                  (1 Point)  [ ] Arthroscopic surgery                                       (2 Points)  [ x] Planned major surgery lasting more            (2 Points)      than 45 minutes     [ ] Elective hip or knee joint replacement       (5 points)       surgery                                                TRAUMA RELATED RISK FACTORS  [ ] Fracture of the hip, pelvis, or leg                       (5 Points)  [ ] Spinal cord injury resulting in paralysis             (5 points)       (in the previous month)    [ ] Paralysis  (less than 1 month)                             (5 Points)  [ ] Multiple Trauma within 1 month                        (5 Points)    Total Score [     3   ]    Caprini Score 0-2: Low Risk, NO VTE prophylaxis required for most patients, encourage ambulation  Caprini Score 3-6: Moderate Risk , pharmacologic VTE prophylaxis is indicated for most patients (in the absence of contraindications)  Caprini Score Greater than or =7: High risk, pharmocologic VTE prophylaxis indicated for most patients (in the absence of contraindications)

## 2025-06-30 NOTE — H&P PST ADULT - HISTORY OF PRESENT ILLNESS
33 yo female  with abnormal heavy menses - scheduled for  dilatation and curettage with hysteroscopy

## 2025-07-02 LAB
CULTURE RESULTS: SIGNIFICANT CHANGE UP
SPECIMEN SOURCE: SIGNIFICANT CHANGE UP

## 2025-07-10 NOTE — OB PROVIDER H&P - NS_PHYSICALALLNEG_OBGYN_ALL_OB
Patient called, stated she want her prescriptions sent to the [facility] pharmacy on [address]. Patient do not want prescriptions sent to the [facility], because they take too long to deliver her medications per patient.     Writer sent prescriptions to the [facility] pharmacy on [address] per patient's request.    All other review of systems negative, except as noted in HPI

## 2025-07-11 ENCOUNTER — TRANSCRIPTION ENCOUNTER (OUTPATIENT)
Age: 35
End: 2025-07-11

## 2025-07-11 ENCOUNTER — OUTPATIENT (OUTPATIENT)
Dept: OUTPATIENT SERVICES | Facility: HOSPITAL | Age: 35
LOS: 1 days | End: 2025-07-11
Payer: COMMERCIAL

## 2025-07-11 VITALS
HEIGHT: 65 IN | RESPIRATION RATE: 14 BRPM | OXYGEN SATURATION: 98 % | TEMPERATURE: 99 F | WEIGHT: 167.99 LBS | SYSTOLIC BLOOD PRESSURE: 89 MMHG | DIASTOLIC BLOOD PRESSURE: 58 MMHG | HEART RATE: 82 BPM

## 2025-07-11 VITALS — SYSTOLIC BLOOD PRESSURE: 107 MMHG | DIASTOLIC BLOOD PRESSURE: 72 MMHG | HEART RATE: 65 BPM | RESPIRATION RATE: 17 BRPM

## 2025-07-11 DIAGNOSIS — K42.9 UMBILICAL HERNIA WITHOUT OBSTRUCTION OR GANGRENE: Chronic | ICD-10-CM

## 2025-07-11 DIAGNOSIS — Z98.891 HISTORY OF UTERINE SCAR FROM PREVIOUS SURGERY: Chronic | ICD-10-CM

## 2025-07-11 DIAGNOSIS — Z98.890 OTHER SPECIFIED POSTPROCEDURAL STATES: Chronic | ICD-10-CM

## 2025-07-11 LAB — HCG UR QL: NEGATIVE — SIGNIFICANT CHANGE UP

## 2025-07-11 PROCEDURE — 88305 TISSUE EXAM BY PATHOLOGIST: CPT | Mod: 26

## 2025-07-11 RX ORDER — HYDROMORPHONE/SOD CHLOR,ISO/PF 2 MG/10 ML
0.5 SYRINGE (ML) INJECTION
Refills: 0 | Status: DISCONTINUED | OUTPATIENT
Start: 2025-07-11 | End: 2025-07-12

## 2025-07-11 RX ORDER — HYDROMORPHONE/SOD CHLOR,ISO/PF 2 MG/10 ML
1 SYRINGE (ML) INJECTION
Refills: 0 | Status: DISCONTINUED | OUTPATIENT
Start: 2025-07-11 | End: 2025-07-12

## 2025-07-11 RX ORDER — SODIUM CHLORIDE 9 G/1000ML
1000 INJECTION, SOLUTION INTRAVENOUS
Refills: 0 | Status: DISCONTINUED | OUTPATIENT
Start: 2025-07-11 | End: 2025-07-11

## 2025-07-11 RX ORDER — SODIUM CHLORIDE 9 G/1000ML
1000 INJECTION, SOLUTION INTRAVENOUS
Refills: 0 | Status: DISCONTINUED | OUTPATIENT
Start: 2025-07-11 | End: 2025-07-12

## 2025-07-11 RX ORDER — IBUPROFEN 200 MG
1 TABLET ORAL
Qty: 9 | Refills: 0
Start: 2025-07-11 | End: 2025-07-13

## 2025-07-11 RX ORDER — DOXYCYCLINE HYCLATE 100 MG
100 TABLET ORAL ONCE
Refills: 0 | Status: COMPLETED | OUTPATIENT
Start: 2025-07-11 | End: 2025-07-11

## 2025-07-11 RX ORDER — DOXYCYCLINE HYCLATE 100 MG
1 TABLET ORAL
Qty: 10 | Refills: 0
Start: 2025-07-11 | End: 2025-07-15

## 2025-07-11 RX ORDER — ONDANSETRON HCL/PF 4 MG/2 ML
4 VIAL (ML) INJECTION ONCE
Refills: 0 | Status: DISCONTINUED | OUTPATIENT
Start: 2025-07-11 | End: 2025-07-12

## 2025-07-11 RX ADMIN — Medication 100 MILLIGRAM(S): at 12:31

## 2025-07-11 RX ADMIN — SODIUM CHLORIDE 75 MILLILITER(S): 9 INJECTION, SOLUTION INTRAVENOUS at 09:10

## 2025-07-11 NOTE — ASU PATIENT PROFILE, ADULT - FALL HARM RISK - UNIVERSAL INTERVENTIONS
Bed in lowest position, wheels locked, appropriate side rails in place/Call bell, personal items and telephone in reach/Instruct patient to call for assistance before getting out of bed or chair/Non-slip footwear when patient is out of bed/[place] to call system/Physically safe environment - no spills, clutter or unnecessary equipment/Purposeful Proactive Rounding/Room/bathroom lighting operational, light cord in reach
Statement Selected

## 2025-07-11 NOTE — ASU DISCHARGE PLAN (ADULT/PEDIATRIC) - CARE PROVIDER_API CALL
Luh Leung  Obstetrics & Gynecology  [address] [place] [address]  Phone: [phone]  Fax: [phone]  Follow Up Time: 2 weeks

## 2025-07-11 NOTE — BRIEF OPERATIVE NOTE - NSICDXBRIEFPROCEDURE_GEN_ALL_CORE_FT
PROCEDURES:  Dilation and curettage, uterus, non-obstetrical, diagnostic or therapeutic 11-Jul-2025 12:40:55  Luh Leung  Hysteroscopy 11-Jul-2025 12:41:54  Luh Leung  Exam under anesthesia, pelvic 11-Jul-2025 12:42:08  Luh Leung

## 2025-07-11 NOTE — ASU DISCHARGE PLAN (ADULT/PEDIATRIC) - FINANCIAL ASSISTANCE
[facility] provides services at a reduced cost to those who are determined to be eligible through [facility]’s financial assistance program. Information regarding [facility]’s financial assistance program can be found by going to [email or web address] or by calling [phone].

## 2025-07-11 NOTE — BRIEF OPERATIVE NOTE - OPERATION/FINDINGS
8-10 week size, AV uterus with no palpable adnexal masses. Cavity lined with hypertrophic endometrium without ant fibroids or polyps visible. Ostia visualized, bilaterally.  Fluid Deficit 100 ml.

## 2025-07-14 LAB — SURGICAL PATHOLOGY STUDY: SIGNIFICANT CHANGE UP

## 2025-07-16 DIAGNOSIS — N92.0 EXCESSIVE AND FREQUENT MENSTRUATION WITH REGULAR CYCLE: ICD-10-CM

## 2025-07-28 PROBLEM — N92.0 EXCESSIVE AND FREQUENT MENSTRUATION WITH REGULAR CYCLE: Chronic | Status: ACTIVE | Noted: 2025-07-11

## 2025-09-08 ENCOUNTER — APPOINTMENT (OUTPATIENT)
Dept: INTERNAL MEDICINE | Facility: CLINIC | Age: 35
End: 2025-09-08
Payer: COMMERCIAL

## 2025-09-08 VITALS
WEIGHT: 172 LBS | DIASTOLIC BLOOD PRESSURE: 58 MMHG | HEIGHT: 63 IN | OXYGEN SATURATION: 96 % | HEART RATE: 86 BPM | SYSTOLIC BLOOD PRESSURE: 100 MMHG | BODY MASS INDEX: 30.48 KG/M2

## 2025-09-08 DIAGNOSIS — R06.02 SHORTNESS OF BREATH: ICD-10-CM

## 2025-09-08 DIAGNOSIS — Z00.00 ENCOUNTER FOR GENERAL ADULT MEDICAL EXAMINATION W/OUT ABNORMAL FINDINGS: ICD-10-CM

## 2025-09-08 DIAGNOSIS — R45.89 OTHER SYMPTOMS AND SIGNS INVOLVING EMOTIONAL STATE: ICD-10-CM

## 2025-09-08 DIAGNOSIS — G71.09 OTHER SPECIFIED MUSCULAR DYSTROPHIES: ICD-10-CM

## 2025-09-08 DIAGNOSIS — Z82.49 FAMILY HISTORY OF ISCHEMIC HEART DISEASE AND OTHER DISEASES OF THE CIRCULATORY SYSTEM: ICD-10-CM

## 2025-09-08 DIAGNOSIS — E66.9 OBESITY, UNSPECIFIED: ICD-10-CM

## 2025-09-08 PROCEDURE — 93000 ELECTROCARDIOGRAM COMPLETE: CPT

## 2025-09-08 PROCEDURE — 99395 PREV VISIT EST AGE 18-39: CPT

## 2025-09-09 LAB
ALBUMIN SERPL ELPH-MCNC: 4.4 G/DL
ALP BLD-CCNC: 70 U/L
ALT SERPL-CCNC: 12 U/L
ANION GAP SERPL CALC-SCNC: 10 MMOL/L
AST SERPL-CCNC: 23 U/L
BILIRUB SERPL-MCNC: 0.2 MG/DL
BUN SERPL-MCNC: 13 MG/DL
CALCIUM SERPL-MCNC: 9.9 MG/DL
CHLORIDE SERPL-SCNC: 106 MMOL/L
CHOLEST SERPL-MCNC: 152 MG/DL
CO2 SERPL-SCNC: 24 MMOL/L
CREAT SERPL-MCNC: 0.92 MG/DL
EGFRCR SERPLBLD CKD-EPI 2021: 83 ML/MIN/1.73M2
ESTIMATED AVERAGE GLUCOSE: 103 MG/DL
GLUCOSE SERPL-MCNC: 90 MG/DL
HBA1C MFR BLD HPLC: 5.2 %
HCT VFR BLD CALC: 36.7 %
HDLC SERPL-MCNC: 57 MG/DL
HGB BLD-MCNC: 11.9 G/DL
LDLC SERPL-MCNC: 71 MG/DL
MCHC RBC-ENTMCNC: 28.6 PG
MCHC RBC-ENTMCNC: 32.4 G/DL
MCV RBC AUTO: 88.2 FL
NONHDLC SERPL-MCNC: 95 MG/DL
PLATELET # BLD AUTO: 364 K/UL
POTASSIUM SERPL-SCNC: 5.1 MMOL/L
PROT SERPL-MCNC: 7 G/DL
RBC # BLD: 4.16 M/UL
RBC # FLD: 15.2 %
SODIUM SERPL-SCNC: 139 MMOL/L
TRIGL SERPL-MCNC: 138 MG/DL
TSH SERPL-ACNC: 1.18 UIU/ML
WBC # FLD AUTO: 8.53 K/UL

## 2025-09-15 ENCOUNTER — TRANSCRIPTION ENCOUNTER (OUTPATIENT)
Age: 35
End: 2025-09-15

## 2025-09-17 ENCOUNTER — TRANSCRIPTION ENCOUNTER (OUTPATIENT)
Age: 35
End: 2025-09-17

## (undated) DEVICE — BASIN SET SINGLE

## (undated) DEVICE — PACK LITHOTOMY

## (undated) DEVICE — SOL IRR LR 3000ML

## (undated) DEVICE — URETERAL CATH RED RUBBER 14FR (GREEN)

## (undated) DEVICE — Device

## (undated) DEVICE — MYOSURE SCOPE SEAL

## (undated) DEVICE — WARMING BLANKET UPPER ADULT

## (undated) DEVICE — VENODYNE/SCD SLEEVE CALF MEDIUM